# Patient Record
Sex: MALE | Race: WHITE | Employment: OTHER | URBAN - METROPOLITAN AREA
[De-identification: names, ages, dates, MRNs, and addresses within clinical notes are randomized per-mention and may not be internally consistent; named-entity substitution may affect disease eponyms.]

---

## 2017-03-07 ENCOUNTER — OFFICE VISIT (OUTPATIENT)
Dept: SURGERY | Facility: CLINIC | Age: 62
End: 2017-03-07

## 2017-03-07 VITALS
DIASTOLIC BLOOD PRESSURE: 77 MMHG | TEMPERATURE: 99 F | SYSTOLIC BLOOD PRESSURE: 134 MMHG | BODY MASS INDEX: 23 KG/M2 | WEIGHT: 168 LBS | HEART RATE: 79 BPM

## 2017-03-07 DIAGNOSIS — K64.4 EXTERNAL HEMORRHOIDS: ICD-10-CM

## 2017-03-07 DIAGNOSIS — Z21 HIV POSITIVE (HCC): ICD-10-CM

## 2017-03-07 DIAGNOSIS — K64.8 INTERNAL HEMORRHOIDS: ICD-10-CM

## 2017-03-07 DIAGNOSIS — C09.9 TONSIL CANCER (HCC): ICD-10-CM

## 2017-03-07 DIAGNOSIS — L81.9 PIGMENTED SKIN LESION OF UNCERTAIN NATURE: ICD-10-CM

## 2017-03-07 DIAGNOSIS — K62.82 DYSPLASIA OF ANUS: ICD-10-CM

## 2017-03-07 DIAGNOSIS — B97.7 HPV IN MALE: ICD-10-CM

## 2017-03-07 DIAGNOSIS — D01.3 CARCINOMA IN SITU OF ANAL CANAL: Primary | ICD-10-CM

## 2017-03-07 PROCEDURE — 46600 DIAGNOSTIC ANOSCOPY SPX: CPT | Performed by: COLON & RECTAL SURGERY

## 2017-03-07 PROCEDURE — 99214 OFFICE O/P EST MOD 30 MIN: CPT | Performed by: COLON & RECTAL SURGERY

## 2017-03-07 NOTE — PROGRESS NOTES
Follow Up Visit Note       Active Problems      1. Carcinoma in situ of anal canal    2. Dysplasia of anus    3. HPV in male    4. HIV positive ([facility] [facility]    5. Internal hemorrhoids    6. External hemorrhoids    7. [facility]    8.  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 Disp:  Rfl:    Abacavir Sulfate-Lamivudine (EPZICOM) 600-300 MG Oral Tab Take 1 tablet by mouth daily. Disp:  Rfl:    famotidine (PEPCID) 40 MG Oral Tab Take 40 mg by mouth 2 (two) times daily.  Disp:  Rfl: appears well-developed and well-nourished. No distress. Eyes: Right conjunctiva is not injected. Left conjunctiva is not injected. No scleral icterus. Neck: Trachea normal. Carotid bruit is not present. No tracheal deviation present.  No thyroid mass an superficial cervical, no deep cervical and no posterior cervical adenopathy present. Right: No supraclavicular adenopathy present. Left: No supraclavicular adenopathy present.    Neurological: He is alert and oriented to person, place, and ramon awake, alert, in no acute distress. He has no cervical adenopathy or thyromegaly. His lungs are clear to auscultation bilaterally. His heart rate and rhythm are regular. His abdomen is soft, nontender, nondistended, with normoactive bowel sounds.   He d assessment and plan. The physician agrees with the mentioned assessment and plan and has provided further documentation of their own if necessary.

## 2017-03-09 PROBLEM — L81.9 PIGMENTED SKIN LESION OF UNCERTAIN NATURE: Status: ACTIVE | Noted: 2017-03-09

## 2017-03-09 NOTE — PROCEDURES
Procedure:  Anoscopy    Surgeon: Paz Tai    Anesthesia: None    Findings: See the progress note attached for all findings    Operative Summary: The patient was placed in a prone position on the proctoscopy table, the hips were flexed in the jackknife p

## 2017-03-09 NOTE — PATIENT INSTRUCTIONS
This patient presents for continued care and treatment of his AIN 3 lesion of the anal canal.  The patient's initial operation was performed at an outside institution.   He presented to [facility] for wide local excision where the residual tumor was re They are all anywhere from 1-7 cm from the anal verge. There are moderate internal grade 3 hemorrhoids. There is no proctitis or ulcerations. There are no fissures or fistula.   The prostate is normal.    Due to the patient's increasing number in skin le

## 2017-04-03 ENCOUNTER — TELEPHONE (OUTPATIENT)
Dept: SURGERY | Facility: CLINIC | Age: 62
End: 2017-04-03

## 2017-04-03 DIAGNOSIS — K62.82 AIN (ANAL INTRAEPITHELIAL NEOPLASIA) ANAL CANAL: Primary | ICD-10-CM

## 2017-04-04 RX ORDER — ATORVASTATIN CALCIUM 10 MG/1
10 TABLET, FILM COATED ORAL NIGHTLY
COMMUNITY
End: 2022-01-18 | Stop reason: ALTCHOICE

## 2017-04-11 ENCOUNTER — ANESTHESIA EVENT (OUTPATIENT)
Dept: SURGERY | Facility: HOSPITAL | Age: 62
End: 2017-04-11
Payer: MEDICARE

## 2017-04-14 ENCOUNTER — SURGERY (OUTPATIENT)
Age: 62
End: 2017-04-14

## 2017-04-14 ENCOUNTER — ANESTHESIA (OUTPATIENT)
Dept: SURGERY | Facility: HOSPITAL | Age: 62
End: 2017-04-14
Payer: MEDICARE

## 2017-04-14 ENCOUNTER — HOSPITAL ENCOUNTER (OUTPATIENT)
Facility: HOSPITAL | Age: 62
Setting detail: HOSPITAL OUTPATIENT SURGERY
Discharge: HOME | End: 2017-04-14
Attending: COLON & RECTAL SURGERY | Admitting: COLON & RECTAL SURGERY
Payer: MEDICARE

## 2017-04-14 VITALS
DIASTOLIC BLOOD PRESSURE: 85 MMHG | RESPIRATION RATE: 16 BRPM | TEMPERATURE: 98 F | BODY MASS INDEX: 22.62 KG/M2 | WEIGHT: 167 LBS | SYSTOLIC BLOOD PRESSURE: 134 MMHG | HEIGHT: 72 IN | OXYGEN SATURATION: 100 % | HEART RATE: 87 BPM

## 2017-04-14 DIAGNOSIS — K62.82 AIN (ANAL INTRAEPITHELIAL NEOPLASIA) ANAL CANAL: ICD-10-CM

## 2017-04-14 PROCEDURE — 88305 TISSUE EXAM BY PATHOLOGIST: CPT | Performed by: COLON & RECTAL SURGERY

## 2017-04-14 PROCEDURE — 0DBQ7ZZ EXCISION OF ANUS, VIA NATURAL OR ARTIFICIAL OPENING: ICD-10-PCS | Performed by: COLON & RECTAL SURGERY

## 2017-04-14 RX ORDER — METOCLOPRAMIDE HYDROCHLORIDE 5 MG/ML
10 INJECTION INTRAMUSCULAR; INTRAVENOUS AS NEEDED
Status: DISCONTINUED | OUTPATIENT
Start: 2017-04-14 | End: 2017-04-14

## 2017-04-14 RX ORDER — NALOXONE HYDROCHLORIDE 0.4 MG/ML
80 INJECTION, SOLUTION INTRAMUSCULAR; INTRAVENOUS; SUBCUTANEOUS AS NEEDED
Status: DISCONTINUED | OUTPATIENT
Start: 2017-04-14 | End: 2017-04-14

## 2017-04-14 RX ORDER — ONDANSETRON 2 MG/ML
4 INJECTION INTRAMUSCULAR; INTRAVENOUS AS NEEDED
Status: DISCONTINUED | OUTPATIENT
Start: 2017-04-14 | End: 2017-04-14

## 2017-04-14 RX ORDER — HYDROCODONE BITARTRATE AND ACETAMINOPHEN 10; 325 MG/1; MG/1
2 TABLET ORAL AS NEEDED
Status: DISCONTINUED | OUTPATIENT
Start: 2017-04-14 | End: 2017-04-14

## 2017-04-14 RX ORDER — ACETAMINOPHEN AND CODEINE PHOSPHATE 120; 12 MG/5ML; MG/5ML
10 SOLUTION ORAL EVERY 6 HOURS PRN
Qty: 118 ML | Refills: 0 | Status: SHIPPED | OUTPATIENT
Start: 2017-04-14 | End: 2022-01-18 | Stop reason: ALTCHOICE

## 2017-04-14 RX ORDER — EPHEDRINE SULFATE 50 MG/ML
INJECTION, SOLUTION INTRAVENOUS
Status: COMPLETED
Start: 2017-04-14 | End: 2017-04-14

## 2017-04-14 RX ORDER — HYDROCODONE BITARTRATE AND ACETAMINOPHEN 10; 325 MG/1; MG/1
1 TABLET ORAL AS NEEDED
Status: DISCONTINUED | OUTPATIENT
Start: 2017-04-14 | End: 2017-04-14

## 2017-04-14 RX ORDER — HEPARIN SODIUM 5000 [USP'U]/ML
5000 INJECTION, SOLUTION INTRAVENOUS; SUBCUTANEOUS ONCE
Status: COMPLETED | OUTPATIENT
Start: 2017-04-14 | End: 2017-04-14

## 2017-04-14 RX ORDER — SODIUM CHLORIDE, SODIUM LACTATE, POTASSIUM CHLORIDE, CALCIUM CHLORIDE 600; 310; 30; 20 MG/100ML; MG/100ML; MG/100ML; MG/100ML
INJECTION, SOLUTION INTRAVENOUS CONTINUOUS
Status: DISCONTINUED | OUTPATIENT
Start: 2017-04-14 | End: 2017-04-14

## 2017-04-14 RX ORDER — HEPARIN SODIUM 5000 [USP'U]/ML
INJECTION, SOLUTION INTRAVENOUS; SUBCUTANEOUS
Status: DISCONTINUED
Start: 2017-04-14 | End: 2017-04-14

## 2017-04-14 RX ORDER — HYDROMORPHONE HYDROCHLORIDE 1 MG/ML
0.4 INJECTION, SOLUTION INTRAMUSCULAR; INTRAVENOUS; SUBCUTANEOUS EVERY 5 MIN PRN
Status: DISCONTINUED | OUTPATIENT
Start: 2017-04-14 | End: 2017-04-14

## 2017-04-14 RX ORDER — MEPERIDINE HYDROCHLORIDE 25 MG/ML
12.5 INJECTION INTRAMUSCULAR; INTRAVENOUS; SUBCUTANEOUS AS NEEDED
Status: DISCONTINUED | OUTPATIENT
Start: 2017-04-14 | End: 2017-04-14

## 2017-04-14 RX ORDER — MIDAZOLAM HYDROCHLORIDE 1 MG/ML
1 INJECTION INTRAMUSCULAR; INTRAVENOUS EVERY 5 MIN PRN
Status: DISCONTINUED | OUTPATIENT
Start: 2017-04-14 | End: 2017-04-14

## 2017-04-14 NOTE — INTERVAL H&P NOTE
Pre-op Diagnosis: AIN (anal intraepithelial neoplasia) anal canal [D01.3]    The above referenced H&P was reviewed by Dia Virk MD on 4/14/2017, the patient was examined and no significant changes have occurred in the patient's condition since the H&P

## 2017-04-14 NOTE — ANESTHESIA POSTPROCEDURE EVALUATION
[address] [place] [address] Patient Status:  Hospital Outpatient Surgery   Age/Gender 59year old male MRN [id number]   Location [address] Attending Wilbert Harris MD   Hosp Day # 0 PCP Sharif Cvaazos MD       Anesthesia

## 2017-04-14 NOTE — H&P (VIEW-ONLY)
Active Problems      1. Carcinoma in situ of anal canal     2. Dysplasia of anus     3. HPV in male     4. HIV positive ([facility]     5. Internal hemorrhoids     6. External hemorrhoids     7. [facility]     8.   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 Disp:   Rfl:     acyclovir (ZOVIRAX) 800 MG Oral Tab  Take 800 mg by mouth 2 (two) times daily.   Disp:   Rfl:     Abacavir Sulfate-Lamivudine (EPZ disturbance. Physical Findings    Wt 168 lb  Physical Exam   Constitutional: He is oriented to person, place, and time. He appears well-developed and well-nourished. No distress. Eyes: Right conjunctiva is not injected.  Left conjunctiva is not inj Right cervical: No superficial cervical, no deep cervical and no posterior cervical adenopathy present. Left cervical: No superficial cervical, no deep cervical and no posterior cervical adenopathy present.         Right: No supraclavicular adenopathy feeds with very mild chronic reflux. His weight and energy level remains stable. Physical exam:  The patient is awake, alert, in no acute distress. He has no cervical adenopathy or thyromegaly. His lungs are clear to auscultation bilaterally.   His he

## 2017-04-14 NOTE — OPERATIVE REPORT
[facility]  Op Note    Oscar Denver Location: OR   CSN [id number] MRN [id number]   Admission Date 4/14/2017 Operation Date 4/14/2017   Attending Physician Eddie Amador MD Operating Physician Bob Pearce MD     Pre-Operative Diagnosis: AIN (anal the anterior midline. Lesion was then sent to pathology for further histologic diagnosis. We then closed the resultant defect with 2-0 Vicryl. Several pigmented surrounding skin lesions were identified in the anal margin.   Some were resected full-thic

## 2017-04-14 NOTE — ANESTHESIA PREPROCEDURE EVALUATION
PRE-OP EVALUATION    Patient Name: Romy Dodson    Pre-op Diagnosis: AIN (anal intraepithelial neoplasia) anal canal [D01.3]    Procedure(s):  EXCISION OF SKIN LESION TIMES TWO AT ANAL MARGIN    Surgeon(s) and Role:     Yara Morris MD - Primary allergies. Anesthesia Evaluation    Patient summary reviewed.     Anesthetic Complications           GI/Hepatic/Renal      (+) GERD                           Cardiovascular                                                       Endo/Other

## 2017-04-17 ENCOUNTER — TELEPHONE (OUTPATIENT)
Dept: SURGERY | Facility: CLINIC | Age: 62
End: 2017-04-17

## 2017-04-17 NOTE — TELEPHONE ENCOUNTER
Pt called in am to state he has fever of 101, had surgery on anal skin tags 4/14. Doesn't know if fever is related to surgery. His other complaint is that he is constipated, states he did have a bowel movement yesterday but feels the need to go.  Told pt to

## 2017-04-18 ENCOUNTER — TELEPHONE (OUTPATIENT)
Dept: SURGERY | Facility: CLINIC | Age: 62
End: 2017-04-18

## 2017-04-18 ENCOUNTER — HOSPITAL ENCOUNTER (EMERGENCY)
Facility: HOSPITAL | Age: 62
Discharge: HOME | End: 2017-04-18
Attending: EMERGENCY MEDICINE
Payer: MEDICARE

## 2017-04-18 VITALS
SYSTOLIC BLOOD PRESSURE: 117 MMHG | RESPIRATION RATE: 20 BRPM | HEART RATE: 94 BPM | TEMPERATURE: 99 F | OXYGEN SATURATION: 98 % | DIASTOLIC BLOOD PRESSURE: 90 MMHG

## 2017-04-18 DIAGNOSIS — K56.41 FECAL IMPACTION(560.32): Primary | ICD-10-CM

## 2017-04-18 PROCEDURE — 99283 EMERGENCY DEPT VISIT LOW MDM: CPT

## 2017-04-18 NOTE — ED PROVIDER NOTES
Patient Seen in: [facility] Emergency Department    History   Patient presents with:  Constipation (gastrointestinal)    Stated Complaint: constipation - surgery on friday    HPI    Patient presents with constipation.   The patient had surgery on Fri External hemorrhoids 6/12/2012   • High cholesterol    • Exposure to radiation      [id number]   • Visual impairment      glasses reading and driving   • Esophageal reflux    • Carcinoma in situ of anal canal 10/9/2012     anal canal neoplasm carcinoma in s (AMBIEN) 10 MG Oral Tab,  Take 10 mg by mouth nightly as needed. sulfamethoxazole-trimethoprim (SMZ-TMP DS) 800-160 MG Oral Tab,  Take 1 tablet by mouth.  3x/week: MWF        Family History   Problem Relation Age of Onset   • CAD [Other] [OTHER] Father pressure on that area, it would be okay to perform a disimpaction. The patient was amenable to the attempt and I tried a few times to manually disimpact him and got only a small amount of stool.   The patient was having significant discomfort with the proc

## 2017-04-18 NOTE — TELEPHONE ENCOUNTER
Pt went to [facility] last night and they didn't do anything for him per the pt. He is in constant pain and has terrible pressure at his anus. He states he can feel a hard piece of poop stuck inside his rectum.  He called to state he is going to [facility]

## 2017-05-09 ENCOUNTER — OFFICE VISIT (OUTPATIENT)
Dept: SURGERY | Facility: CLINIC | Age: 62
End: 2017-05-09

## 2017-05-09 VITALS
WEIGHT: 165 LBS | HEIGHT: 72 IN | RESPIRATION RATE: 16 BRPM | DIASTOLIC BLOOD PRESSURE: 54 MMHG | BODY MASS INDEX: 22.35 KG/M2 | TEMPERATURE: 99 F | SYSTOLIC BLOOD PRESSURE: 70 MMHG

## 2017-05-09 DIAGNOSIS — D01.3 CARCINOMA IN SITU OF ANAL CANAL: Primary | ICD-10-CM

## 2017-05-09 PROCEDURE — 99024 POSTOP FOLLOW-UP VISIT: CPT | Performed by: COLON & RECTAL SURGERY

## 2017-05-09 NOTE — PATIENT INSTRUCTIONS
This patient presents for postoperative care regarding multiple anal margin biopsies. Final diagnosis does reveal at least one of the lesions to be a squamous cell carcinoma in situ, AIN 3. The patient has had some postoperative pain and symptoms.   H

## 2017-05-09 NOTE — H&P
New Patient Visit Note       Active Problems      1. Carcinoma in situ of anal canal        Chief Complaint   Patient presents with:  Anal Problem (GI): 1st PO EXCISION OF SKIN LESION TIMES TWO AT ANAL MARGIN on 4/14.  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 Disp:  Rfl:    Abacavir Sulfate-Lamivudine (EPZICOM) 600-300 MG Oral Tab Take 1 tablet by mouth daily. Disp:  Rfl:    famotidine (PEPCID) 40 MG Oral Tab Take 40 mg by mouth 2 (two) times daily.  Disp:  Rfl:    Lopinavir-Ritonavir (KALETRA) 400- several areas that were either excised or fulgurated. 2 areas still had suture material in their center. I did remove to sutures from 2 different sites. This should diminish drainage, and improve symptoms.            Assessment   Carcinoma in situ of misael

## 2017-09-26 ENCOUNTER — OFFICE VISIT (OUTPATIENT)
Dept: SURGERY | Facility: CLINIC | Age: 62
End: 2017-09-26

## 2017-09-26 VITALS
DIASTOLIC BLOOD PRESSURE: 62 MMHG | SYSTOLIC BLOOD PRESSURE: 100 MMHG | WEIGHT: 165 LBS | TEMPERATURE: 99 F | BODY MASS INDEX: 22 KG/M2

## 2017-09-26 DIAGNOSIS — K64.8 INTERNAL HEMORRHOIDS: ICD-10-CM

## 2017-09-26 DIAGNOSIS — Z21 HIV POSITIVE (HCC): ICD-10-CM

## 2017-09-26 DIAGNOSIS — D01.3 CARCINOMA IN SITU OF ANAL CANAL: Primary | ICD-10-CM

## 2017-09-26 PROCEDURE — 99213 OFFICE O/P EST LOW 20 MIN: CPT | Performed by: COLON & RECTAL SURGERY

## 2017-09-26 PROCEDURE — 46600 DIAGNOSTIC ANOSCOPY SPX: CPT | Performed by: COLON & RECTAL SURGERY

## 2017-09-26 NOTE — PATIENT INSTRUCTIONS
This patient presents for continued care and treatment of his AIN 3 lesion of the anal canal.   Patient's most recent operation was performed on April 14, 2017. It did demonstrate a recurrence of the AIN 3.  The patient's initial operation was performed at
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
## 2017-09-27 NOTE — PROCEDURES
Procedure:  Anoscopy    Surgeon: Darrin Ye    Anesthesia: None    Findings: See the progress note attached for all findings    Operative Summary: The patient was placed in a prone position on the proctoscopy table, the hips were flexed in the jackknife p

## 2018-03-20 ENCOUNTER — OFFICE VISIT (OUTPATIENT)
Dept: SURGERY | Facility: CLINIC | Age: 63
End: 2018-03-20

## 2018-03-20 VITALS
BODY MASS INDEX: 22 KG/M2 | DIASTOLIC BLOOD PRESSURE: 83 MMHG | TEMPERATURE: 99 F | SYSTOLIC BLOOD PRESSURE: 110 MMHG | HEART RATE: 78 BPM | WEIGHT: 165 LBS

## 2018-03-20 DIAGNOSIS — D01.3 CARCINOMA IN SITU OF ANAL CANAL: Primary | ICD-10-CM

## 2018-03-20 PROCEDURE — 99213 OFFICE O/P EST LOW 20 MIN: CPT | Performed by: COLON & RECTAL SURGERY

## 2018-03-20 PROCEDURE — 46600 DIAGNOSTIC ANOSCOPY SPX: CPT | Performed by: COLON & RECTAL SURGERY

## 2018-03-20 NOTE — PROCEDURES
Procedure:  Anoscopy    Surgeon: Justin Sal    Anesthesia: None    Findings: See the progress note attached for all findings    Operative Summary: The patient was placed in a prone position on the proctoscopy table, the hips were flexed in the jackknife p
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
## 2018-03-20 NOTE — PATIENT INSTRUCTIONS
This patient presents for continued care and treatment of his AIN 3 lesion of the anal canal.  The patient's initial operation was performed at an outside institution.   He presented to [facility] for wide local excision where the residual tumor was re

## 2018-09-18 ENCOUNTER — OFFICE VISIT (OUTPATIENT)
Dept: SURGERY | Facility: CLINIC | Age: 63
End: 2018-09-18
Payer: MEDICARE

## 2018-09-18 VITALS
BODY MASS INDEX: 22 KG/M2 | WEIGHT: 165 LBS | TEMPERATURE: 99 F | DIASTOLIC BLOOD PRESSURE: 74 MMHG | HEART RATE: 74 BPM | SYSTOLIC BLOOD PRESSURE: 109 MMHG

## 2018-09-18 DIAGNOSIS — C06.9 MALIGNANT NEOPLASM OF ORAL CAVITY (HCC): ICD-10-CM

## 2018-09-18 DIAGNOSIS — K62.82 DYSPLASIA OF ANUS: ICD-10-CM

## 2018-09-18 DIAGNOSIS — D01.3 CARCINOMA IN SITU OF ANAL CANAL: Primary | ICD-10-CM

## 2018-09-18 DIAGNOSIS — Z21 HIV POSITIVE (HCC): ICD-10-CM

## 2018-09-18 PROCEDURE — 46600 DIAGNOSTIC ANOSCOPY SPX: CPT | Performed by: COLON & RECTAL SURGERY

## 2018-09-18 PROCEDURE — 99213 OFFICE O/P EST LOW 20 MIN: CPT | Performed by: COLON & RECTAL SURGERY

## 2018-09-18 NOTE — PATIENT INSTRUCTIONS
This patient presents for continued care and treatment of his AIN 3 lesion of the anal canal.  The patient's most recent operation was performed on April 14, 2017. Final pathology did demonstrate a recurrence of the AIN 3.   The patient's initial operation patient for continued follow-up in 6 months time. If there are any questions or concerns prior to his scheduled appointment he should contact our office immediately.

## 2018-09-18 NOTE — PROGRESS NOTES
Follow Up Visit Note       Active Problems      1. Carcinoma in situ of anal canal    2. Dysplasia of anus    3. HIV positive ([facility]    4.  Malignant neoplasm of oral cavity [facility])          Chief Complaint   Patient presents with:  Anal Problem (GI): anal can performed my own physical exam and obtained the history as detailed above.   I have made all appropriate changes in documentation as necessary  I attest to the accuracy of this note as detailed above    Jesus Pugh MD [address] tube [phone]\"  2006: OTHER SURGICAL HISTORY      Comment:  open surgical reduction of a fracture (plates and                screws) - right toe  6/12/2012: UPPER GI ENDOSCOPY,EXAM      Comment:  EGD    The family history and social history have been rev Lopinavir-Ritonavir (KALETRA) 400-100 MG/5ML Oral Solution Take  by mouth 2 (two) times daily. Disp:  Rfl:    Metoclopramide HCl (REGLAN) 10 MG Oral Tab Take 10 mg by mouth 2 (two) times daily.  Disp:  Rfl:    Esomeprazole Magnesium (NEXIUM) 40 MG Oral Po internal hemorrhoid. Rectal exam shows no external hemorrhoid, no fissure, no mass, no tenderness and anal tone normal. Prostate is not enlarged and not tender.    Genitourinary Comments: Clinical examination of the perineum reveals her to be no evidence of to bolus feeds over the last 1 month. He states that he has been tolerating this very well. It has greatly helped with his heartburn and reflux symptoms. The patient states his bowel movements are unchanged.   He has a regular timing of his bowel movem

## 2018-09-18 NOTE — PROCEDURES
Procedure:  Anoscopy    Surgeon: Jennifer Shultz    Anesthesia: None    Findings: See the progress note attached for all findings    Operative Summary: The patient was placed in a prone position on the proctoscopy table, the hips were flexed in the jackknife p

## 2019-04-30 ENCOUNTER — OFFICE VISIT (OUTPATIENT)
Dept: SURGERY | Facility: CLINIC | Age: 64
End: 2019-04-30
Payer: MEDICARE

## 2019-04-30 VITALS
HEART RATE: 69 BPM | DIASTOLIC BLOOD PRESSURE: 70 MMHG | BODY MASS INDEX: 22.35 KG/M2 | RESPIRATION RATE: 18 BRPM | HEIGHT: 72 IN | WEIGHT: 165 LBS | SYSTOLIC BLOOD PRESSURE: 112 MMHG

## 2019-04-30 DIAGNOSIS — K64.2 PROLAPSED INTERNAL HEMORRHOIDS, GRADE 3: ICD-10-CM

## 2019-04-30 DIAGNOSIS — D01.3 CARCINOMA IN SITU OF ANAL CANAL: Primary | ICD-10-CM

## 2019-04-30 DIAGNOSIS — Z21 HIV POSITIVE (HCC): ICD-10-CM

## 2019-04-30 DIAGNOSIS — C06.9 MALIGNANT NEOPLASM OF ORAL CAVITY (HCC): ICD-10-CM

## 2019-04-30 PROCEDURE — 46600 DIAGNOSTIC ANOSCOPY SPX: CPT | Performed by: COLON & RECTAL SURGERY

## 2019-04-30 PROCEDURE — 99213 OFFICE O/P EST LOW 20 MIN: CPT | Performed by: COLON & RECTAL SURGERY

## 2019-04-30 NOTE — PROGRESS NOTES
Follow Up Visit Note       Active Problems      1. Carcinoma in situ of anal canal    2. HIV positive ([address]    3. Malignant neoplasm of oral cavity ([address]    4.  Prolapsed internal hemorrhoids, grade 3          Chief Complaint   Patient presents with:  Anal greatly improved his energy level and stamina. The patient underwent a colonoscopy approximately 1 year ago. He states that there was no abnormal findings. I acted as a scribe in this encounter.      The physician obtained a history, independently pe 8/15/2012   • Neuropathy     feet   • Personal history of antineoplastic chemotherapy     [id number]   • Rectal polyp 6/12/2012   • Sleep disturbances    • Throat pain    • Tonsil cancer ([facility] 2010    squamous cell cancer of tonsil, treated with radiation a Rfl:   •  famotidine (PEPCID) 40 MG Oral Tab, Take 40 mg by mouth 2 (two) times daily. , Disp: , Rfl:   •  Lopinavir-Ritonavir (KALETRA) 400-100 MG/5ML Oral Solution, Take  by mouth 2 (two) times daily. , Disp: , Rfl:   •  Metoclopramide HCl (REGLAN) 10 MG O place, and time. He appears well-developed and well-nourished. No distress. HENT:   Head: Normocephalic and atraumatic. Eyes: Conjunctivae and EOM are normal. No scleral icterus. Neck: Normal range of motion. Neck supple.    Pulmonary/Chest: Effort no patient transitioned his tube feeds to bolus feeding approximately 6 months ago. He states that this has greatly improved his gastro esophageal reflux. He is having very minimal reflux symptoms. He does not currently tolerate any oral intake.   His weigh months (around 10/30/2019).     Nelda Rea MD

## 2019-04-30 NOTE — PROCEDURES
Procedure:  Anoscopy    Surgeon: Bob Pearce    Anesthesia: None    Findings: See the progress note attached for all findings    Operative Summary: The patient was placed in a prone position on the proctoscopy table, the hips were flexed in the jackknife p

## 2019-04-30 NOTE — PATIENT INSTRUCTIONS
Loreto Sumner presents for continued evaluation and treatment regarding his AIN 3 lesion of the anal canal.  The patient's most recent operation was performed on April 14, 2017. Final pathology did demonstrate a recurrence of his AIN 3.   The patient's initial o Anoscopy was performed. This does demonstrate grade 3 internal hemorrhoids. There is no changes to the mucosal surface. There is no evidence of proctitis. There is no evidence of fissures or fistula.     This patient remains free of any evidence of recu

## 2019-09-03 ENCOUNTER — OFFICE VISIT (OUTPATIENT)
Dept: SURGERY | Facility: CLINIC | Age: 64
End: 2019-09-03
Payer: MEDICARE

## 2019-09-03 VITALS
SYSTOLIC BLOOD PRESSURE: 106 MMHG | WEIGHT: 165 LBS | BODY MASS INDEX: 22 KG/M2 | DIASTOLIC BLOOD PRESSURE: 64 MMHG | HEART RATE: 74 BPM | TEMPERATURE: 99 F

## 2019-09-03 DIAGNOSIS — K62.82 DYSPLASIA OF ANUS: ICD-10-CM

## 2019-09-03 DIAGNOSIS — Z21 HIV POSITIVE (HCC): ICD-10-CM

## 2019-09-03 DIAGNOSIS — C09.9 TONSIL CANCER (HCC): ICD-10-CM

## 2019-09-03 DIAGNOSIS — D01.3 CARCINOMA IN SITU OF ANAL CANAL: Primary | ICD-10-CM

## 2019-09-03 PROCEDURE — 99213 OFFICE O/P EST LOW 20 MIN: CPT | Performed by: COLON & RECTAL SURGERY

## 2019-09-03 PROCEDURE — 46600 DIAGNOSTIC ANOSCOPY SPX: CPT | Performed by: COLON & RECTAL SURGERY

## 2019-09-03 NOTE — PROCEDURES
Procedure:  Anoscopy    Surgeon: Deny Farr    Anesthesia: None    Findings: See the progress note attached for all findings    Operative Summary: The patient was placed in a prone position on the proctoscopy table, the hips were flexed in the jackknife p

## 2019-09-03 NOTE — PATIENT INSTRUCTIONS
Courtney Oscar presents for continued evaluation and treatment regarding his AIN 3 lesion of the anal canal.  The patient's most recent operation was performed on April 14, 2017. Final pathology did demonstrate a recurrence of his AIN 3.   The patient's initial o and rhythm are regular. His abdomen is soft, nontender, nondistended, normoactive bowel sounds present. The patient's G-tube is in good position with no surrounding erythema or drainage.   External examination of the perineum reveals no evidence of any sk

## 2019-09-03 NOTE — PROGRESS NOTES
Follow Up Visit Note       Active Problems      1. Carcinoma in situ of anal canal    2. Dysplasia of anus    3. HIV positive ([facility]    4.  Tonsil cancer [facility])          Chief Complaint   Patient presents with:  Rectal Cancer: pt is here for rectal cancer- 6 determined. The patient was treated with 24 hours of IV antibiotics and discharged home on oral antibiotics. He has followed up with his primary care physician. He has had no return of his fevers. I acted as a scribe in this encounter.      The ph hemorrhoids 8/6/2013   • Malignant neoplasm of skin 8/15/2012   • Neuropathy     feet   • Personal history of antineoplastic chemotherapy     [id number]   • Rectal polyp 6/12/2012   • Sleep disturbances    • Throat pain    • [facility]    [facility] 800 MG Oral Tab, Take 800 mg by mouth 2 (two) times daily. , Disp: , Rfl:   •  Abacavir Sulfate-Lamivudine (EPZICOM) 600-300 MG Oral Tab, Take 1 tablet by mouth daily. , Disp: , Rfl:   •  famotidine (PEPCID) 40 MG Oral Tab, Take 40 mg by 22.38 kg/m²   Physical Exam   Constitutional: He is oriented to person, place, and time. He appears well-developed and well-nourished. No distress. HENT:   Head: Normocephalic and atraumatic. Eyes: Conjunctivae and EOM are normal. No scleral icterus. patient's initial operation was performed at an outside institution. He presented to myself for care in 2012. He underwent wide local excision on September 12, 2012.   The final pathology did demonstrate AIN 3 present but the deep margin was free of dyspl no evidence of any skin lesions at the anal skin or anal margin. There are no ulcerations or condylomatous lesions. Digital rectal exam was performed and the prostate is normal.  There are no palpable masses.   He has 2/4 basal tone and maximal squeeze to

## 2020-03-03 ENCOUNTER — OFFICE VISIT (OUTPATIENT)
Dept: SURGERY | Facility: CLINIC | Age: 65
End: 2020-03-03
Payer: MEDICARE

## 2020-03-03 VITALS — HEART RATE: 66 BPM | SYSTOLIC BLOOD PRESSURE: 90 MMHG | TEMPERATURE: 98 F | DIASTOLIC BLOOD PRESSURE: 53 MMHG

## 2020-03-03 DIAGNOSIS — C09.9 TONSIL CANCER (HCC): ICD-10-CM

## 2020-03-03 DIAGNOSIS — D01.3 CARCINOMA IN SITU OF ANAL CANAL: Primary | ICD-10-CM

## 2020-03-03 DIAGNOSIS — K64.2 PROLAPSED INTERNAL HEMORRHOIDS, GRADE 3: ICD-10-CM

## 2020-03-03 DIAGNOSIS — Z21 HIV POSITIVE (HCC): ICD-10-CM

## 2020-03-03 DIAGNOSIS — B97.7 HPV IN MALE: ICD-10-CM

## 2020-03-03 PROCEDURE — 46600 DIAGNOSTIC ANOSCOPY SPX: CPT | Performed by: COLON & RECTAL SURGERY

## 2020-03-03 PROCEDURE — 99213 OFFICE O/P EST LOW 20 MIN: CPT | Performed by: COLON & RECTAL SURGERY

## 2020-03-03 NOTE — PATIENT INSTRUCTIONS
Judith Guzman presents for continued evaluation and treatment regarding his AIN 3 lesion of the anal canal.  The patient's most recent operation was performed on April 14, 2017. Final pathology did demonstrate a recurrence of his AIN 3.   The patient's initial o evidence of any recurrence of his AIN 3 or any evidence of squamous cell carcinoma of the anus. I will see the patient again in 6 months for continued care and evaluation. The patient should return sooner for any new or worsening symptoms.

## 2020-03-03 NOTE — PROGRESS NOTES
Follow Up Visit Note       Active Problems      1. Carcinoma in situ of anal canal    2. Prolapsed internal hemorrhoids, grade 3    3. Tonsil cancer ([facility]    4. HIV positive ([facility]    5.  HPV in male          Chief Complaint   Patient presents with:  Anal / an assessment and plan. The physician performed all medical decision making. Elzbieta Alarcon PA-C    I agree with the note as scribed by the PA  I performed my own physical exam and obtained the history as detailed above.   I have made all appropriate glasses reading and driving     Past Surgical History:   Procedure Laterality Date   • COLONOSCOPY  6/12/2012   • EXC RECT DUSTIN TRANSANAL FULL  9/12/2012   • EXCISION ANAL MASS/POLYP N/A 4/14/2017    Performed by Jacquelyn Bentley MD at [address] [place] [address]   • OTHER Solution, Take  by mouth 2 (two) times daily. , Disp: , Rfl:   •  Metoclopramide HCl (REGLAN) 10 MG Oral Tab, Take 10 mg by mouth 2 (two) times daily. , Disp: , Rfl:   •  Esomeprazole Magnesium (NEXIUM) 40 MG Oral Powd Pack, Take 40 mg by mouth every morning normal and normal heart sounds. No murmur heard. Pulmonary/Chest: No accessory muscle usage. No tachypnea. No respiratory distress. He has no decreased breath sounds. He has no wheezes. He has no rhonchi. He has no rales. Abdominal: Soft.  Normal appea inguinal and no supraclavicular adenopathy present. Nursing note and vitals reviewed.        Assessment   Carcinoma in situ of anal canal  (primary encounter diagnosis)  Prolapsed internal hemorrhoids, grade 3  Tonsil cancer (HCC)  HIV positive (HCC)  HPV shape and size. There are no palpable masses. On anoscopy there is no evidence of any condyloma, ulceration, excoriation, evidence of any Crohn's disease, or proctitis. There are large grade 3 hemorrhoids circumferentially.     The patient continues to d

## 2020-09-15 ENCOUNTER — OFFICE VISIT (OUTPATIENT)
Dept: SURGERY | Facility: CLINIC | Age: 65
End: 2020-09-15
Payer: MEDICARE

## 2020-09-15 VITALS
DIASTOLIC BLOOD PRESSURE: 80 MMHG | BODY MASS INDEX: 21.67 KG/M2 | WEIGHT: 160 LBS | SYSTOLIC BLOOD PRESSURE: 129 MMHG | TEMPERATURE: 99 F | HEIGHT: 72 IN | HEART RATE: 70 BPM

## 2020-09-15 DIAGNOSIS — D01.3 CARCINOMA IN SITU OF ANAL CANAL: Primary | ICD-10-CM

## 2020-09-15 DIAGNOSIS — B97.7 HPV IN MALE: ICD-10-CM

## 2020-09-15 DIAGNOSIS — K22.719 BARRETT'S ESOPHAGUS WITH DYSPLASIA: ICD-10-CM

## 2020-09-15 DIAGNOSIS — L81.9 PIGMENTED SKIN LESION OF UNCERTAIN NATURE: ICD-10-CM

## 2020-09-15 DIAGNOSIS — Z21 HIV POSITIVE (HCC): ICD-10-CM

## 2020-09-15 PROCEDURE — 46600 DIAGNOSTIC ANOSCOPY SPX: CPT | Performed by: COLON & RECTAL SURGERY

## 2020-09-15 PROCEDURE — 99213 OFFICE O/P EST LOW 20 MIN: CPT | Performed by: COLON & RECTAL SURGERY

## 2020-09-15 NOTE — PROGRESS NOTES
Follow Up Visit Note       Active Problems      1. Carcinoma in situ of anal canal    2. HPV in male    3. HIV positive ([facility] [address]    4. Pigmented skin lesion of uncertain nature    5.  Quick's esophagus with dysplasia          Chief Complaint   Patient presen medication for HIV decreased to 1 time per day.   This did cause some hypotension, however this has resolved since seeing cardiology and nephrology.      The patient does have a PEG feeding tube in place.  The patient has had a recent G-tube exchange in Feb neoplasia   • Esophageal reflux    • Exposure to radiation     [id number]   • External hemorrhoids 6/12/2012   • Hepatitis     Hep A and B 20 years ago   • Hiatal hernia 6/12/2012    small   • High cholesterol    • History of hepatitis 1996   • HIV positive Oral Tab, Take 50 mg by mouth daily. , Disp: , Rfl:   •  Darunavir-Cobicistat 800-150 MG Oral Tab, Take 1 tablet by mouth daily. , Disp: , Rfl:   •  Abacavir Sulfate-Lamivudine (EPZICOM) 600-300 MG Oral Tab, Take 1 tablet by mouth daily. , Disp: , Rfl:   •  f nausea and vomiting. Genitourinary: Negative for difficulty urinating, dysuria, frequency and urgency. Musculoskeletal: Negative for arthralgias and myalgias. Skin: Negative for color change and rash.    Neurological: Negative for tremors, syncope and Lichenification  No Abscess  No Fistula in ano  No Anterior Fissure  No Posterior Fissure  No Pilonidal Cyst    See Procedures:  Anoscopy    Clinical examination of the rectum including anoscopy reveals there to be no external condyloma, hemorrhoids, fissu he has no new issues since his last visit. He denies any blood, mucus, or dark tarry stools. He denies having any itching, irritation, or pain. He does not palpate any new lesions, lumps or bumps.   He denies having any fevers, chills, nausea, vomiting, care and evaluation. The patient should return sooner for any new or worsening symptoms. No orders of the defined types were placed in this encounter. Imaging & Referrals   None    Follow Up  Return in 26 weeks (on 3/16/2021).     Siobhan Lim MD

## 2020-09-15 NOTE — PATIENT INSTRUCTIONS
Eduar Saunders presents for continued evaluation and treatment regarding his AIN 3 lesion of the anal canal.  The patient's most recent operation was performed on April 14, 2017.  Final pathology did demonstrate a recurrence of his AIN 3.  The patient's initial o fissures, fistula, or abscesses. Digital rectal exam reveals the prostate to be of normal shape and size. There are no palpable masses.   On anoscopy there is no evidence of any condyloma, ulceration, excoriation, evidence of any Crohn's disease, hemorrho

## 2020-09-15 NOTE — PROCEDURES
Procedure:  Anoscopy    Surgeon: Sera Mcclendon    Anesthesia: None    Findings: See the progress note attached for all findings    Operative Summary: The patient was placed in a prone position on the proctoscopy table, the hips were flexed in the jackknife p

## 2021-03-30 ENCOUNTER — OFFICE VISIT (OUTPATIENT)
Dept: SURGERY | Facility: CLINIC | Age: 66
End: 2021-03-30
Payer: MEDICARE

## 2021-03-30 VITALS
DIASTOLIC BLOOD PRESSURE: 49 MMHG | BODY MASS INDEX: 21.67 KG/M2 | SYSTOLIC BLOOD PRESSURE: 86 MMHG | HEART RATE: 68 BPM | HEIGHT: 72 IN | TEMPERATURE: 98 F | WEIGHT: 160 LBS

## 2021-03-30 DIAGNOSIS — C44.90 MALIGNANT NEOPLASM OF SKIN: ICD-10-CM

## 2021-03-30 DIAGNOSIS — D01.3 CARCINOMA IN SITU OF ANAL CANAL: Primary | ICD-10-CM

## 2021-03-30 DIAGNOSIS — Z01.818 PRE-OP TESTING: ICD-10-CM

## 2021-03-30 DIAGNOSIS — C09.9 TONSIL CANCER (HCC): ICD-10-CM

## 2021-03-30 DIAGNOSIS — Z21 HIV POSITIVE (HCC): ICD-10-CM

## 2021-03-30 DIAGNOSIS — L81.9 PIGMENTED SKIN LESION OF UNCERTAIN NATURE: ICD-10-CM

## 2021-03-30 DIAGNOSIS — K62.82 DYSPLASIA OF ANUS: ICD-10-CM

## 2021-03-30 PROCEDURE — 99215 OFFICE O/P EST HI 40 MIN: CPT | Performed by: COLON & RECTAL SURGERY

## 2021-03-30 PROCEDURE — 46600 DIAGNOSTIC ANOSCOPY SPX: CPT | Performed by: COLON & RECTAL SURGERY

## 2021-03-30 NOTE — PROCEDURES
Procedure:  Anoscopy    Surgeon: Anuja Hurtado    Anesthesia: None    Findings: See the progress note attached for all findings    Operative Summary: The patient was placed in a prone position on the proctoscopy table, the hips were flexed in the jackknife p

## 2021-03-30 NOTE — PROGRESS NOTES
Follow Up Visit Note       Active Problems      1. Carcinoma in situ of anal canal    2. Dysplasia of anus    3. HIV positive ([address]    4. Tonsil cancer ([address]    5. Malignant neoplasm of skin    6.  Pigmented skin lesion of uncertain nature          Chief Co history, performing the examination, counseling and education, ordering tests, referring and communicating with other healthcare professionals, documenting clinical information, independently interpreting results, coordinating care, and communication of an • Tonsil cancer ([facility] [address] 2010    squamous cell cancer of tonsil, treated with radiation and chemotherapy   • Visual impairment     glasses reading and driving     Past Surgical History:   Procedure Laterality Date   • COLONOSCOPY  6/12/2012   • EXC RECT TU     Attends Confucianism Services:       Active Member of Clubs or Organizations:       Attends Club or Organization Meetings:       Marital Status:   Intimate Partner Violence:       Fear of Current or Ex-Partner:       Emotionally Abused:       Physically nosebleeds, sore throat and trouble swallowing. Respiratory: Negative for apnea, cough, shortness of breath and wheezing. Cardiovascular: Negative for chest pain, palpitations and leg swelling.    Gastrointestinal: Negative for abdominal distention, a lesions. Prostate is diminished in size. There are no external hemorrhoids. There is no proctitis or Crohn's disease.                Assessment   Carcinoma in situ of anal canal  (primary encounter diagnosis)  Dysplasia of anus  HIV positive ([facility] [address]  Tonsi size.  They are irregularly shaped. They could possibly be Kaposi sarcoma. They have not changed much from prior exam, except potentially becoming more pigmented. There are no HPV lesions. There are no AIN type ulcerations or lesions.   Prostate is dimi

## 2021-03-30 NOTE — PATIENT INSTRUCTIONS
This patient presents for continued care and treatment of his AIN 3 lesion of the anal canal.  The patient's initial operation was performed at an outside institution.   He presented to [facility] for wide local excision where the residual tumor was re disease. At this point I recommend excisional biopsy of the 2 soft tissue pigmented lesions on the right anterolateral aspect of the anal margin 3 cm away from the anal verge.     This patient has some personal issues that we will put off the surgery unt

## 2021-06-18 ENCOUNTER — LAB REQUISITION (OUTPATIENT)
Dept: LAB | Facility: HOSPITAL | Age: 66
End: 2021-06-18
Payer: MEDICARE

## 2021-06-18 DIAGNOSIS — Z00.8 ENCOUNTER FOR OTHER GENERAL EXAMINATION: ICD-10-CM

## 2021-06-18 PROCEDURE — 88305 TISSUE EXAM BY PATHOLOGIST: CPT | Performed by: COLON & RECTAL SURGERY

## 2021-07-06 ENCOUNTER — OFFICE VISIT (OUTPATIENT)
Dept: SURGERY | Facility: CLINIC | Age: 66
End: 2021-07-06

## 2021-07-06 VITALS
BODY MASS INDEX: 21.67 KG/M2 | HEART RATE: 76 BPM | HEIGHT: 72 IN | SYSTOLIC BLOOD PRESSURE: 88 MMHG | WEIGHT: 160 LBS | DIASTOLIC BLOOD PRESSURE: 53 MMHG | TEMPERATURE: 98 F

## 2021-07-06 DIAGNOSIS — B00.89 HERPES SIMPLEX VIRUS (HSV) INFECTION OF BUTTOCK: ICD-10-CM

## 2021-07-06 DIAGNOSIS — K64.2 PROLAPSED INTERNAL HEMORRHOIDS, GRADE 3: ICD-10-CM

## 2021-07-06 DIAGNOSIS — L08.9 HERPES SIMPLEX VIRUS (HSV) INFECTION OF BUTTOCK: ICD-10-CM

## 2021-07-06 DIAGNOSIS — B97.7 HPV IN MALE: ICD-10-CM

## 2021-07-06 DIAGNOSIS — D01.3 CARCINOMA IN SITU OF ANAL CANAL: Primary | ICD-10-CM

## 2021-07-06 DIAGNOSIS — Z21 HIV POSITIVE (HCC): ICD-10-CM

## 2021-07-06 DIAGNOSIS — K62.82 DYSPLASIA OF ANUS: ICD-10-CM

## 2021-07-06 PROCEDURE — 99024 POSTOP FOLLOW-UP VISIT: CPT | Performed by: COLON & RECTAL SURGERY

## 2021-07-06 RX ORDER — LEVOTHYROXINE SODIUM 0.07 MG/1
TABLET ORAL
COMMUNITY
Start: 2021-04-28

## 2021-07-06 RX ORDER — TESTOSTERONE CYPIONATE 200 MG/ML
INJECTION INTRAMUSCULAR
COMMUNITY
Start: 2021-06-14

## 2021-07-06 NOTE — PROGRESS NOTES
Patient in office currently. Pathology reviewed to patient by Dr. Loreta Guerrero. Copy provided to patient. 3 month follow up appointment made.     Future Appointments  10/12/2021 2:00 PM    Vladimir Jeffrey MD          [id number]        [facility] [address]

## 2021-07-06 NOTE — PATIENT INSTRUCTIONS
This patient is known to have had prior in situ squamous cell cancer of the anus, dysplasia and other lesions, and has required significant surveillance of the perianal skin. He is HIV positive.     The patient underwent excisional biopsy of 2 suspicious l

## 2021-07-06 NOTE — PROGRESS NOTES
Post Operative Visit Note       Active Problems  1. Carcinoma in situ of anal canal    2. Dysplasia of anus    3. HPV in male    4. HIV positive ([facility] [address]    5. Prolapsed internal hemorrhoids, grade 3    6.  Herpes simplex virus (HSV) infection of buttock External hemorrhoids 6/12/2012   • Hepatitis     Hep A and B 20 years ago   • Hiatal hernia 6/12/2012    small   • High cholesterol    • History of hepatitis 1996   • HIV positive ([facility] [address]    • HPV in male 8/14/2012   • Internal hemorrhoids 8/6/2013   • Caleb Melendez by Tube route daily. , Disp: , Rfl:   •  Dolutegravir Sodium (TIVICAY) 50 MG Oral Tab, Take 50 mg by mouth daily. , Disp: , Rfl:   •  Darunavir-Cobicistat 800-150 MG Oral Tab, Take 1 tablet by mouth daily. , Disp: , Rfl:   •  Atorvastatin Calcium 10 MG Oral T distention, abdominal pain, anal bleeding, blood in stool, constipation, diarrhea, nausea and vomiting. Genitourinary: Negative for difficulty urinating, dysuria, frequency and urgency. Musculoskeletal: Negative for arthralgias and myalgias.    Skin: Ne September 2021. I will see the patient again in 3 months for continued surveillance on the perianal region secondary to his in situ squamous cell cancer of the anus, other dysplasia of the anal region, and now the HSV, and HPV infections.     The patient

## 2021-10-12 ENCOUNTER — OFFICE VISIT (OUTPATIENT)
Dept: SURGERY | Facility: CLINIC | Age: 66
End: 2021-10-12
Payer: MEDICARE

## 2021-10-12 VITALS — BODY MASS INDEX: 22.35 KG/M2 | HEIGHT: 72 IN | TEMPERATURE: 98 F | WEIGHT: 165 LBS

## 2021-10-12 DIAGNOSIS — L08.9 HERPES SIMPLEX VIRUS (HSV) INFECTION OF BUTTOCK: ICD-10-CM

## 2021-10-12 DIAGNOSIS — B97.7 HPV IN MALE: ICD-10-CM

## 2021-10-12 DIAGNOSIS — D01.3 CARCINOMA IN SITU OF ANAL CANAL: Primary | ICD-10-CM

## 2021-10-12 DIAGNOSIS — B00.89 HERPES SIMPLEX VIRUS (HSV) INFECTION OF BUTTOCK: ICD-10-CM

## 2021-10-12 PROCEDURE — 99213 OFFICE O/P EST LOW 20 MIN: CPT | Performed by: COLON & RECTAL SURGERY

## 2021-10-12 PROCEDURE — 46600 DIAGNOSTIC ANOSCOPY SPX: CPT | Performed by: COLON & RECTAL SURGERY

## 2021-10-12 NOTE — PROGRESS NOTES
Follow Up Visit Note       Active Problems      1. Carcinoma in situ of anal canal    2. Herpes simplex virus (HSV) infection of buttock    3.  HPV in male          Chief Complaint   Patient presents with:  Colon Problem: 3 month continued care on excision patient follows with Dr. Ning Ibarra as his infectious disease physician who manages his HIV. He states his CD4 count has been stable around 170 for the last 10 years. I acted as a scribe in this encounter.      The physician obtained a history, inde External hemorrhoids 6/12/2012   • Hepatitis     Hep A and B 20 years ago   • Hiatal hernia 6/12/2012    small   • High cholesterol    • History of hepatitis 1996   • HIV positive ([facility] [address]    • HPV in male 8/14/2012   • Internal hemorrhoids 8/6/2013   • Cathryn Minor 800-150 MG Oral Tab, Take 1 tablet by mouth daily. , Disp: , Rfl:   •  acetaminophen-codeine 120-12 MG/5ML Oral Solution, Take 10 mL by mouth every 6 (six) hours as needed for Pain., Disp: 118 mL, Rfl: 0  •  Atorvastatin Calcium 10 MG Oral Tab, Take 10 mg b myalgias. Skin: Negative for color change and rash. Neurological: Negative for tremors, syncope and weakness. Hematological: Negative for adenopathy. Does not bruise/bleed easily.    Psychiatric/Behavioral: Negative for behavioral problems and sleep d at this time. He states that he is doing well overall. He denies any new lumps, bumps, or masses. He denies any new noticeable skin changes around the anus.   He states that he is having his normal bowel movements. The patient's last colonoscopy was in 2

## 2021-10-12 NOTE — PROCEDURES
Procedure:  Anoscopy    Surgeon: Louis Tanner    Anesthesia: None    Findings: See the progress note attached for all findings    Operative Summary: The patient was placed in a prone position on the proctoscopy table, the hips were flexed in the jackknife p

## 2021-10-12 NOTE — PATIENT INSTRUCTIONS
This patient presents for continued care and treatment of his AIN 3 lesion of the anal canal.     The patient's initial operation was performed at an outside [address] [place] [address] presented to [facility] for wide local excision where the residual tumor was evidence of any condyloma, ulceration, excoriation, evidence of any Crohn's disease, hemorrhoids, or proctitis. The patient demonstrates no new evidence of any new AIN lesions or condyloma.   I will see the patient again in 3 months as he did have recent

## 2022-01-18 ENCOUNTER — OFFICE VISIT (OUTPATIENT)
Dept: SURGERY | Facility: CLINIC | Age: 67
End: 2022-01-18
Payer: MEDICARE

## 2022-01-18 VITALS
DIASTOLIC BLOOD PRESSURE: 70 MMHG | HEIGHT: 72 IN | BODY MASS INDEX: 22.35 KG/M2 | SYSTOLIC BLOOD PRESSURE: 111 MMHG | HEART RATE: 74 BPM | WEIGHT: 165 LBS | TEMPERATURE: 98 F

## 2022-01-18 DIAGNOSIS — K62.82 DYSPLASIA OF ANUS: ICD-10-CM

## 2022-01-18 DIAGNOSIS — C09.9 TONSIL CANCER (HCC): ICD-10-CM

## 2022-01-18 DIAGNOSIS — K64.2 PROLAPSED INTERNAL HEMORRHOIDS, GRADE 3: ICD-10-CM

## 2022-01-18 DIAGNOSIS — B97.7 HPV IN MALE: ICD-10-CM

## 2022-01-18 DIAGNOSIS — Z21 HIV POSITIVE (HCC): ICD-10-CM

## 2022-01-18 DIAGNOSIS — D01.3 CARCINOMA IN SITU OF ANAL CANAL: Primary | ICD-10-CM

## 2022-01-18 PROCEDURE — 46600 DIAGNOSTIC ANOSCOPY SPX: CPT | Performed by: COLON & RECTAL SURGERY

## 2022-01-18 PROCEDURE — 99213 OFFICE O/P EST LOW 20 MIN: CPT | Performed by: COLON & RECTAL SURGERY

## 2022-01-18 NOTE — PATIENT INSTRUCTIONS
This patient presents for continued care and treatment of his AIN 3 lesion of the anal canal.  The patient's initial operation was performed at an outside institution.   He presented to [facility] for wide local excision where the residual tumor was re If this hemorrhoid gets bigger or causes further symptoms I would recommend rubber band treatments. He remains free of any evidence of recurrent or residual AIN 3, HPV, or other pigmented lesions. I will see his patient again in 3 months.   He should

## 2022-01-18 NOTE — PROGRESS NOTES
Follow Up Visit Note       Active Problems      1. Carcinoma in situ of anal canal    2. Dysplasia of anus    3. HPV in male    4. HIV positive ([facility] [address]    5. Tonsil cancer ([facility] [address]    6.  Prolapsed internal hemorrhoids, grade 3          Chief Complaint   Patient weight and energy level remains stable. My total time spent with this patient on today's visit was 20 minutes.   This includes time in preparation, obtaining and reviewing history, performing the examination, counseling and education, ordering tests, r Laterality Date   • COLONOSCOPY  6/12/2012   • EXC RECT DUSTIN TRANSANAL FULL  9/12/2012   • OTHER      port insertion   • OTHER SURGICAL HISTORY  2011    \"j-tube 2011, peg tube [id number]\"   • OTHER SURGICAL HISTORY  2006    open surgical reduction of a fra Take 1 tablet by mouth. 3x/week: MWGILDA , Disp: , Rfl:      Review of Systems  The Review of Systems has been reviewed by me during today. Review of Systems   Constitutional: Negative for chills, diaphoresis, fatigue, fever and unexpected weight change.    HE internal hemorrhoids, grade 3    Plan   This patient presents for continued care and treatment of his AIN 3 lesion of the anal canal.  The patient's initial operation was performed at an outside institution.   He presented to [facility] for wide local canal.  I recommend no current therapy. If this hemorrhoid gets bigger or causes further symptoms I would recommend rubber band treatments. He remains free of any evidence of recurrent or residual AIN 3, HPV, or other pigmented lesions.     I will see h

## 2022-08-30 ENCOUNTER — OFFICE VISIT (OUTPATIENT)
Facility: LOCATION | Age: 67
End: 2022-08-30
Payer: MEDICARE

## 2022-08-30 VITALS — TEMPERATURE: 98 F | HEART RATE: 60 BPM

## 2022-08-30 DIAGNOSIS — B97.7 HPV IN MALE: ICD-10-CM

## 2022-08-30 DIAGNOSIS — B00.89 HERPES SIMPLEX VIRUS (HSV) INFECTION OF BUTTOCK: ICD-10-CM

## 2022-08-30 DIAGNOSIS — C09.9 TONSIL CANCER (HCC): ICD-10-CM

## 2022-08-30 DIAGNOSIS — D01.3 CARCINOMA IN SITU OF ANAL CANAL: Primary | ICD-10-CM

## 2022-08-30 DIAGNOSIS — K62.82 DYSPLASIA OF ANUS: ICD-10-CM

## 2022-08-30 DIAGNOSIS — Z21 HIV POSITIVE (HCC): ICD-10-CM

## 2022-08-30 PROCEDURE — 99213 OFFICE O/P EST LOW 20 MIN: CPT | Performed by: COLON & RECTAL SURGERY

## 2022-08-30 PROCEDURE — 46600 DIAGNOSTIC ANOSCOPY SPX: CPT | Performed by: COLON & RECTAL SURGERY

## 2022-08-30 NOTE — PATIENT INSTRUCTIONS
This patient presents for continued care and treatment of his AIN 3 lesion of the anal canal.  The patient's initial operation was performed at an outside institution. He presented to [facility] for wide local excision where the residual tumor was resected on September 12, 2012. Pathology did demonstrate AIN 3 present. There was dysplastic epithelium extending to both mucosal margins. The deep margin was free of dysplasia. There was no evidence of invasive cancer. 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 On his last biopsies performed on June 18, 2021, one of the anal lesions also was positive for herpes viral cytopathic changes. The patient denies any symptoms such as nausea, vomiting, diarrhea, constipation, fevers, or chills. He denies any noticeable skin changes near the anus. He denies any black or tarry stools, bright red blood per rectum, or blood within the stool. The patient's last colonoscopy was in 2012 prior to his surgery. 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 The patient has recently followed up with his infectious disease doctor. Both of these issues are stable. The patient does have a PEG feeding tube in place. He is tolerating tube feeds with very mild chronic reflux. His weight and energy level remains stable. Clinical exam including anoscopy reveals him to have an anal margin free of any evidence of new or suspicious lesions. There are no ulcerations. There is no pruritus. There are no signs or evidence of HPV or AIN disease. There are no fissures or fistulae. Prostate is normal.  Anal tone is 1/4 basal, 1/4 maximum squeeze pressure. There is no evidence of proctitis or Crohn's disease. There are no HPV or AIN lesions on the anal canal or anal mucosal surfaces. This patient remains free of any evidence of perianal disease.     I will see him again in January 2023. He should present to me urgently for any findings on self-examination even if there prior to the next scheduled visits.

## 2023-01-09 ENCOUNTER — OFFICE VISIT (OUTPATIENT)
Facility: LOCATION | Age: 68
End: 2023-01-09
Payer: MEDICARE

## 2023-01-09 VITALS — HEART RATE: 95 BPM | TEMPERATURE: 99 F

## 2023-01-09 DIAGNOSIS — B00.89 HERPES SIMPLEX VIRUS (HSV) INFECTION OF BUTTOCK: ICD-10-CM

## 2023-01-09 DIAGNOSIS — Z21 HIV POSITIVE (HCC): ICD-10-CM

## 2023-01-09 DIAGNOSIS — D01.3 CARCINOMA IN SITU OF ANAL CANAL: Primary | ICD-10-CM

## 2023-01-09 DIAGNOSIS — K64.2 PROLAPSED INTERNAL HEMORRHOIDS, GRADE 3: ICD-10-CM

## 2023-01-09 DIAGNOSIS — K62.82 DYSPLASIA OF ANUS: ICD-10-CM

## 2023-01-09 DIAGNOSIS — C09.9 TONSIL CANCER (HCC): ICD-10-CM

## 2023-01-09 DIAGNOSIS — B97.7 HPV IN MALE: ICD-10-CM

## 2023-01-09 PROCEDURE — 99213 OFFICE O/P EST LOW 20 MIN: CPT | Performed by: COLON & RECTAL SURGERY

## 2023-01-09 PROCEDURE — 46600 DIAGNOSTIC ANOSCOPY SPX: CPT | Performed by: COLON & RECTAL SURGERY

## 2023-01-14 NOTE — PATIENT INSTRUCTIONS
This patient presents for continued care and treatment of his AIN 3 lesion of the anal canal.  The patient's initial operation was performed at an outside institution. He presented to [facility] for wide local excision where the residual tumor was resected on September 12, 2012. Pathology did demonstrate AIN 3 present. There was dysplastic epithelium extending to both mucosal margins. The deep margin was free of dysplasia. There was no evidence of invasive cancer. 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 On his last biopsies performed on June 18, 2021, one of the anal lesions also was positive for herpes viral cytopathic changes. The patient denies any symptoms such as nausea, vomiting, diarrhea, constipation, fevers, or chills. He denies any noticeable skin changes near the anus. He denies any black or tarry stools, bright red blood per rectum, or blood within the stool. The patient's last colonoscopy was in 2012 prior to his surgery. 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 The patient has no current findings on self-exam.  He gets occasional bright red blood after defecation only on the toilet paper. He has no melena. No diarrhea. No significant constipation. He has no anorectal pain or symptoms. Clinical exam including anoscopy reveals him to have grade 2 and grade 3 internal hemorrhoids. There are no external hemorrhoids on today's exam.  Sphincter tone is 1/4 basal, 2/4 maximum squeeze pressure. He has scarring and deformity in the anal canal that is minimal.  He has some evidence of scars from biopsies around the anal margin. There are no new signs on physical exam of HPV, AIN, or squamous cell cancer. There is no evidence of Kaposi's sarcoma. There are no new or suspicious pigmented lesions. There are no active herpetic vesicles.     I will see his patient again in July 2023.

## 2023-05-30 ENCOUNTER — HOSPITAL ENCOUNTER (INPATIENT)
Facility: HOSPITAL | Age: 68
LOS: 1 days | Discharge: HOME | DRG: 343 | End: 2023-05-31
Attending: STUDENT IN AN ORGANIZED HEALTH CARE EDUCATION/TRAINING PROGRAM | Admitting: INTERNAL MEDICINE
Payer: MEDICARE

## 2023-05-30 ENCOUNTER — ANESTHESIA EVENT (OUTPATIENT)
Dept: SURGERY | Facility: HOSPITAL | Age: 68
DRG: 343 | End: 2023-05-30
Payer: MEDICARE

## 2023-05-30 ENCOUNTER — ANESTHESIA (OUTPATIENT)
Dept: SURGERY | Facility: HOSPITAL | Age: 68
DRG: 343 | End: 2023-05-30
Payer: MEDICARE

## 2023-05-30 DIAGNOSIS — K37 APPENDICITIS: ICD-10-CM

## 2023-05-30 PROBLEM — K35.80 ACUTE APPENDICITIS: Status: ACTIVE | Noted: 2023-05-30

## 2023-05-30 PROCEDURE — 99223 1ST HOSP IP/OBS HIGH 75: CPT | Performed by: INTERNAL MEDICINE

## 2023-05-30 RX ORDER — ESOMEPRAZOLE MAGNESIUM 40 MG/1
40 CAPSULE, DELAYED RELEASE ORAL
COMMUNITY

## 2023-05-30 RX ORDER — FLUTICASONE PROPIONATE 50 MCG
1 SPRAY, SUSPENSION (ML) NASAL DAILY
COMMUNITY

## 2023-05-30 RX ORDER — METOCLOPRAMIDE HYDROCHLORIDE 5 MG/ML
10 INJECTION INTRAMUSCULAR; INTRAVENOUS EVERY 8 HOURS PRN
Status: DISCONTINUED | OUTPATIENT
Start: 2023-05-30 | End: 2023-05-31

## 2023-05-30 RX ORDER — ONDANSETRON 2 MG/ML
4 INJECTION INTRAMUSCULAR; INTRAVENOUS EVERY 6 HOURS PRN
Status: DISCONTINUED | OUTPATIENT
Start: 2023-05-30 | End: 2023-05-31

## 2023-05-30 RX ORDER — ACYCLOVIR 800 MG/1
800 TABLET ORAL 2 TIMES DAILY
Status: DISCONTINUED | OUTPATIENT
Start: 2023-05-31 | End: 2023-05-31

## 2023-05-30 RX ORDER — ABACAVIR AND LAMIVUDINE 600; 300 MG/1; MG/1
1 TABLET, FILM COATED ORAL DAILY
Status: DISCONTINUED | OUTPATIENT
Start: 2023-05-31 | End: 2023-05-31

## 2023-05-30 RX ORDER — SODIUM CHLORIDE 9 MG/ML
INJECTION, SOLUTION INTRAVENOUS CONTINUOUS
Status: DISCONTINUED | OUTPATIENT
Start: 2023-05-30 | End: 2023-05-31

## 2023-05-30 RX ORDER — ENOXAPARIN SODIUM 100 MG/ML
40 INJECTION SUBCUTANEOUS DAILY
Status: DISCONTINUED | OUTPATIENT
Start: 2023-05-31 | End: 2023-05-31

## 2023-05-30 RX ORDER — SULFAMETHOXAZOLE AND TRIMETHOPRIM 800; 160 MG/1; MG/1
1 TABLET ORAL
Status: DISCONTINUED | OUTPATIENT
Start: 2023-05-31 | End: 2023-05-31

## 2023-05-30 RX ORDER — HYDROMORPHONE HYDROCHLORIDE 1 MG/ML
0.8 INJECTION, SOLUTION INTRAMUSCULAR; INTRAVENOUS; SUBCUTANEOUS EVERY 2 HOUR PRN
Status: DISCONTINUED | OUTPATIENT
Start: 2023-05-30 | End: 2023-05-31

## 2023-05-30 RX ORDER — HYDROMORPHONE HYDROCHLORIDE 1 MG/ML
0.4 INJECTION, SOLUTION INTRAMUSCULAR; INTRAVENOUS; SUBCUTANEOUS EVERY 2 HOUR PRN
Status: DISCONTINUED | OUTPATIENT
Start: 2023-05-30 | End: 2023-05-31

## 2023-05-30 RX ORDER — HYDROMORPHONE HYDROCHLORIDE 1 MG/ML
0.2 INJECTION, SOLUTION INTRAMUSCULAR; INTRAVENOUS; SUBCUTANEOUS EVERY 2 HOUR PRN
Status: DISCONTINUED | OUTPATIENT
Start: 2023-05-30 | End: 2023-05-31

## 2023-05-31 VITALS
HEIGHT: 72 IN | HEART RATE: 68 BPM | DIASTOLIC BLOOD PRESSURE: 87 MMHG | OXYGEN SATURATION: 96 % | RESPIRATION RATE: 18 BRPM | WEIGHT: 167 LBS | TEMPERATURE: 99 F | BODY MASS INDEX: 22.62 KG/M2 | SYSTOLIC BLOOD PRESSURE: 157 MMHG

## 2023-05-31 LAB
ANION GAP SERPL CALC-SCNC: 5 MMOL/L (ref 0–18)
BUN BLD-MCNC: 22 MG/DL (ref 7–18)
CALCIUM BLD-MCNC: 8.9 MG/DL (ref 8.5–10.1)
CHLORIDE SERPL-SCNC: 107 MMOL/L (ref 98–112)
CO2 SERPL-SCNC: 24 MMOL/L (ref 21–32)
CREAT BLD-MCNC: 0.98 MG/DL
ERYTHROCYTE [DISTWIDTH] IN BLOOD BY AUTOMATED COUNT: 12.4 %
GFR SERPLBLD BASED ON 1.73 SQ M-ARVRAT: 83 ML/MIN/1.73M2 (ref 60–?)
GLUCOSE BLD-MCNC: 169 MG/DL (ref 70–99)
HCT VFR BLD AUTO: 35.2 %
HGB BLD-MCNC: 12 G/DL
MCH RBC QN AUTO: 34.9 PG (ref 26–34)
MCHC RBC AUTO-ENTMCNC: 34.1 G/DL (ref 31–37)
MCV RBC AUTO: 102.3 FL
OSMOLALITY SERPL CALC.SUM OF ELEC: 289 MOSM/KG (ref 275–295)
PLATELET # BLD AUTO: 149 10(3)UL (ref 150–450)
POTASSIUM SERPL-SCNC: 4 MMOL/L (ref 3.5–5.1)
RBC # BLD AUTO: 3.44 X10(6)UL
SODIUM SERPL-SCNC: 136 MMOL/L (ref 136–145)
WBC # BLD AUTO: 6.5 X10(3) UL (ref 4–11)

## 2023-05-31 PROCEDURE — 3E0T3BZ INTRODUCTION OF ANESTHETIC AGENT INTO PERIPHERAL NERVES AND PLEXI, PERCUTANEOUS APPROACH: ICD-10-PCS | Performed by: ANESTHESIOLOGY

## 2023-05-31 PROCEDURE — 44970 LAPAROSCOPY APPENDECTOMY: CPT | Performed by: STUDENT IN AN ORGANIZED HEALTH CARE EDUCATION/TRAINING PROGRAM

## 2023-05-31 PROCEDURE — 99222 1ST HOSP IP/OBS MODERATE 55: CPT | Performed by: STUDENT IN AN ORGANIZED HEALTH CARE EDUCATION/TRAINING PROGRAM

## 2023-05-31 PROCEDURE — 99239 HOSP IP/OBS DSCHRG MGMT >30: CPT | Performed by: INTERNAL MEDICINE

## 2023-05-31 PROCEDURE — 0DTJ4ZZ RESECTION OF APPENDIX, PERCUTANEOUS ENDOSCOPIC APPROACH: ICD-10-PCS | Performed by: STUDENT IN AN ORGANIZED HEALTH CARE EDUCATION/TRAINING PROGRAM

## 2023-05-31 RX ORDER — OXYCODONE HYDROCHLORIDE 5 MG/1
5 TABLET ORAL EVERY 4 HOURS PRN
Status: DISCONTINUED | OUTPATIENT
Start: 2023-05-31 | End: 2023-05-31

## 2023-05-31 RX ORDER — HYDROMORPHONE HYDROCHLORIDE 1 MG/ML
0.4 INJECTION, SOLUTION INTRAMUSCULAR; INTRAVENOUS; SUBCUTANEOUS EVERY 5 MIN PRN
Status: DISCONTINUED | OUTPATIENT
Start: 2023-05-31 | End: 2023-05-31 | Stop reason: HOSPADM

## 2023-05-31 RX ORDER — HYDROMORPHONE HYDROCHLORIDE 1 MG/ML
0.2 INJECTION, SOLUTION INTRAMUSCULAR; INTRAVENOUS; SUBCUTANEOUS EVERY 5 MIN PRN
Status: DISCONTINUED | OUTPATIENT
Start: 2023-05-31 | End: 2023-05-31 | Stop reason: HOSPADM

## 2023-05-31 RX ORDER — PHENYLEPHRINE HCL 10 MG/ML
VIAL (ML) INJECTION AS NEEDED
Status: DISCONTINUED | OUTPATIENT
Start: 2023-05-31 | End: 2023-05-31 | Stop reason: SURG

## 2023-05-31 RX ORDER — ONDANSETRON 2 MG/ML
4 INJECTION INTRAMUSCULAR; INTRAVENOUS EVERY 6 HOURS PRN
Status: DISCONTINUED | OUTPATIENT
Start: 2023-05-31 | End: 2023-05-31 | Stop reason: HOSPADM

## 2023-05-31 RX ORDER — SODIUM CHLORIDE, SODIUM LACTATE, POTASSIUM CHLORIDE, CALCIUM CHLORIDE 600; 310; 30; 20 MG/100ML; MG/100ML; MG/100ML; MG/100ML
INJECTION, SOLUTION INTRAVENOUS CONTINUOUS
Status: DISCONTINUED | OUTPATIENT
Start: 2023-05-31 | End: 2023-05-31 | Stop reason: HOSPADM

## 2023-05-31 RX ORDER — ROCURONIUM BROMIDE 10 MG/ML
INJECTION, SOLUTION INTRAVENOUS AS NEEDED
Status: DISCONTINUED | OUTPATIENT
Start: 2023-05-31 | End: 2023-05-31 | Stop reason: SURG

## 2023-05-31 RX ORDER — ONDANSETRON 2 MG/ML
INJECTION INTRAMUSCULAR; INTRAVENOUS AS NEEDED
Status: DISCONTINUED | OUTPATIENT
Start: 2023-05-31 | End: 2023-05-31 | Stop reason: SURG

## 2023-05-31 RX ORDER — SODIUM CHLORIDE, SODIUM LACTATE, POTASSIUM CHLORIDE, CALCIUM CHLORIDE 600; 310; 30; 20 MG/100ML; MG/100ML; MG/100ML; MG/100ML
INJECTION, SOLUTION INTRAVENOUS CONTINUOUS PRN
Status: DISCONTINUED | OUTPATIENT
Start: 2023-05-31 | End: 2023-05-31 | Stop reason: SURG

## 2023-05-31 RX ORDER — NALOXONE HYDROCHLORIDE 0.4 MG/ML
80 INJECTION, SOLUTION INTRAMUSCULAR; INTRAVENOUS; SUBCUTANEOUS AS NEEDED
Status: DISCONTINUED | OUTPATIENT
Start: 2023-05-31 | End: 2023-05-31 | Stop reason: HOSPADM

## 2023-05-31 RX ORDER — LIDOCAINE HYDROCHLORIDE 10 MG/ML
INJECTION, SOLUTION EPIDURAL; INFILTRATION; INTRACAUDAL; PERINEURAL AS NEEDED
Status: DISCONTINUED | OUTPATIENT
Start: 2023-05-31 | End: 2023-05-31 | Stop reason: SURG

## 2023-05-31 RX ORDER — HYDROMORPHONE HYDROCHLORIDE 1 MG/ML
0.6 INJECTION, SOLUTION INTRAMUSCULAR; INTRAVENOUS; SUBCUTANEOUS EVERY 5 MIN PRN
Status: DISCONTINUED | OUTPATIENT
Start: 2023-05-31 | End: 2023-05-31 | Stop reason: HOSPADM

## 2023-05-31 RX ORDER — CEFOXITIN 2 G/1
INJECTION, POWDER, FOR SOLUTION INTRAVENOUS AS NEEDED
Status: DISCONTINUED | OUTPATIENT
Start: 2023-05-31 | End: 2023-05-31 | Stop reason: SURG

## 2023-05-31 RX ORDER — OXYCODONE HYDROCHLORIDE 5 MG/1
2.5 TABLET ORAL EVERY 4 HOURS PRN
Status: DISCONTINUED | OUTPATIENT
Start: 2023-05-31 | End: 2023-05-31

## 2023-05-31 RX ORDER — ACETAMINOPHEN 500 MG
1000 TABLET ORAL EVERY 8 HOURS SCHEDULED
Status: DISCONTINUED | OUTPATIENT
Start: 2023-05-31 | End: 2023-05-31

## 2023-05-31 RX ORDER — EPHEDRINE SULFATE 50 MG/ML
INJECTION INTRAVENOUS AS NEEDED
Status: DISCONTINUED | OUTPATIENT
Start: 2023-05-31 | End: 2023-05-31 | Stop reason: SURG

## 2023-05-31 RX ORDER — METOCLOPRAMIDE HYDROCHLORIDE 5 MG/ML
10 INJECTION INTRAMUSCULAR; INTRAVENOUS EVERY 8 HOURS PRN
Status: DISCONTINUED | OUTPATIENT
Start: 2023-05-31 | End: 2023-05-31 | Stop reason: HOSPADM

## 2023-05-31 RX ORDER — KETOROLAC TROMETHAMINE 30 MG/ML
INJECTION, SOLUTION INTRAMUSCULAR; INTRAVENOUS AS NEEDED
Status: DISCONTINUED | OUTPATIENT
Start: 2023-05-31 | End: 2023-05-31 | Stop reason: SURG

## 2023-05-31 RX ORDER — HYDROMORPHONE HYDROCHLORIDE 1 MG/ML
0.2 INJECTION, SOLUTION INTRAMUSCULAR; INTRAVENOUS; SUBCUTANEOUS EVERY 2 HOUR PRN
Status: DISCONTINUED | OUTPATIENT
Start: 2023-05-31 | End: 2023-05-31

## 2023-05-31 RX ORDER — DEXAMETHASONE SODIUM PHOSPHATE 4 MG/ML
VIAL (ML) INJECTION AS NEEDED
Status: DISCONTINUED | OUTPATIENT
Start: 2023-05-31 | End: 2023-05-31 | Stop reason: SURG

## 2023-05-31 RX ORDER — BUPIVACAINE HYDROCHLORIDE 2.5 MG/ML
INJECTION, SOLUTION EPIDURAL; INFILTRATION; INTRACAUDAL AS NEEDED
Status: DISCONTINUED | OUTPATIENT
Start: 2023-05-31 | End: 2023-05-31 | Stop reason: HOSPADM

## 2023-05-31 RX ORDER — HYDROMORPHONE HYDROCHLORIDE 1 MG/ML
0.4 INJECTION, SOLUTION INTRAMUSCULAR; INTRAVENOUS; SUBCUTANEOUS EVERY 2 HOUR PRN
Status: DISCONTINUED | OUTPATIENT
Start: 2023-05-31 | End: 2023-05-31

## 2023-05-31 RX ADMIN — PHENYLEPHRINE HCL 100 MCG: 10 MG/ML VIAL (ML) INJECTION at 00:30:00

## 2023-05-31 RX ADMIN — SODIUM CHLORIDE, SODIUM LACTATE, POTASSIUM CHLORIDE, CALCIUM CHLORIDE: 600; 310; 30; 20 INJECTION, SOLUTION INTRAVENOUS at 01:36:00

## 2023-05-31 RX ADMIN — LIDOCAINE HYDROCHLORIDE 100 MG: 10 INJECTION, SOLUTION EPIDURAL; INFILTRATION; INTRACAUDAL; PERINEURAL at 00:15:00

## 2023-05-31 RX ADMIN — KETOROLAC TROMETHAMINE 30 MG: 30 INJECTION, SOLUTION INTRAMUSCULAR; INTRAVENOUS at 01:24:00

## 2023-05-31 RX ADMIN — EPHEDRINE SULFATE 10 MG: 50 INJECTION INTRAVENOUS at 00:25:00

## 2023-05-31 RX ADMIN — PHENYLEPHRINE HCL 100 MCG: 10 MG/ML VIAL (ML) INJECTION at 00:36:00

## 2023-05-31 RX ADMIN — SODIUM CHLORIDE, SODIUM LACTATE, POTASSIUM CHLORIDE, CALCIUM CHLORIDE: 600; 310; 30; 20 INJECTION, SOLUTION INTRAVENOUS at 00:11:00

## 2023-05-31 RX ADMIN — DEXAMETHASONE SODIUM PHOSPHATE 8 MG: 4 MG/ML VIAL (ML) INJECTION at 00:22:00

## 2023-05-31 RX ADMIN — ONDANSETRON 4 MG: 2 INJECTION INTRAMUSCULAR; INTRAVENOUS at 00:27:00

## 2023-05-31 RX ADMIN — CEFOXITIN 2 G: 2 INJECTION, POWDER, FOR SOLUTION INTRAVENOUS at 00:22:00

## 2023-05-31 RX ADMIN — ROCURONIUM BROMIDE 20 MG: 10 INJECTION, SOLUTION INTRAVENOUS at 00:21:00

## 2023-05-31 NOTE — PLAN OF CARE
NURSING ADMISSION NOTE      Patient admitted via Cart from [address] to room. Safety precautions initiated. Bed in low position. Call light in reach.

## 2023-05-31 NOTE — ANESTHESIA PROCEDURE NOTES
Airway  Date/Time: 5/31/2023 12:19 AM  Urgency: elective    Airway not difficult    General Information and Staff    Patient location during procedure: OR  Anesthesiologist: Mary Lujan MD  Performed: anesthesiologist   Performed by: Mary Lujan MD  Authorized by: Mary Lujan MD      Indications and Patient Condition  Indications for airway management: anesthesia  Spontaneous Ventilation: absent  Sedation level: deep  Preoxygenated: yes  Patient position: sniffing  Mask difficulty assessment: 1 - vent by mask (Easy mask ventilation)  Planned trial extubation    Final Airway Details  Final airway type: endotracheal airway      Successful airway: ETT  Cuffed: yes   Successful intubation technique: flexible bronchoscopy  Endotracheal tube insertion site: oral  ETT size (mm): 7.0    Placement verified by: chest auscultation and capnometry   Measured from: lips  ETT to lips (cm): 22  Number of attempts at approach: 1    Additional Comments  Easy asleep fiberoptic intubation.  Atraumatic ETT placement-

## 2023-05-31 NOTE — PROGRESS NOTES
Alert & oriented x4. Denies pain. Lap.sites x3 with dermabond c/d/i. No flatus but belching. With PEG tube intact. Patient bolus himself with Osmolite 1.5 480 ml and water flush 240 ml by gravity . Up in room and hallway. Plan of care discussed with patient. Will monitor.

## 2023-05-31 NOTE — PLAN OF CARE
A&Ox4. Blind in Left eye. Hearing aids. Hypertensive, see Flowsheets. MD aware. RA. Denies chest pain and SOB. GI: Abdomen soft, nondistended. RUQ tenderness. Denies flatus at this time. Gastrostomy tube in place. Denies nausea. : Voids. Pain controlled at this time. Up ad yousif. Drains: none  Incisions: Healing scar incisions from prior procedures. Left subclavian Port-a-cath, not accessed. Diet: Strict NPO prior to procedure  S/L, 20G right PIV placed by outside hospital staff  All appropriate safety measures in place. All questions and concerns addressed. Family at bedside. MD consults placed. OSH chart and imaging disc placed in patient chart folder. Dietary needs per patient:  2cal HN bolus. x2 8oz, 3 times a day.    OR Osomolite 1.5 if unavailable

## 2023-05-31 NOTE — PROGRESS NOTES
NURSING DISCHARGE NOTE    Discharged Home via Wheelchair. Accompanied by Support staff  Belongings Taken by patient/family     Verbal and written discharge instructions given to patient. Verbalized understanding. Denies pain at this time. To home in stable condition. Romana Danielson

## 2023-05-31 NOTE — OPERATIVE REPORT
OPERATIVE NOTE    Sheryle Rancher Location: OR   MANNY [id number] MRN [id number]   Admission Date 5/30/2023 Operation Date 5/31/2023   Attending Physician Ron Patel MD Operating Physician Endy Schofield MD     PREOPERATIVE DIAGNOSIS  Acute appendicitis    POSTOPERATIVE DIAGNOSIS  Acute uncomplicated appendicitis    PROCEDURE PERFORMED  Laparoscopic appendectomy  Transversus abdominis plane block    SURGEON  Endy Schofield MD    ASSISTANTS  None    ANESTHESIA  General    FINDINGS   Inflamed and dilated appendix with normal tip, no purulence or evidence of perforation    INDICATIONS  69year old male presented to hospital with 1 day history of abdominal pain. CT imaging revealed dilated appendix consistent with acute appendicitis. Patient had localized peritonitis on exam. Surgery was indicated. DESCRIPTION OF PROCEDURE  After informed consent, patient was brought to the operating room and placed in supine position. Bilateral SCDs were placed and pre-operative antibiotics administered. Anesthesia was induced without any complication. Patient was prepped and draped in the usual sterile fashion. Time out was performed confirming patient, procedure, and site. Patient had multiple prior surgeries, including a midline laparotomy and G-tube placement. It was decided to go left lower quadrant for access. The Veress needle was used to gain pneumoperitoneum. Using blade, an incision for 5 mm port in the left lower quadrant was made. Veress needle was inserted with audible clicks. Pneumoperitoneum was created with CO2. A 5 mm port was placed. Upon entrance, no injury was noted to omentum or bowel at site of entrance. Patient has significant abdominal adhesions to the midline as well as in the left upper quadrant. A second 5 mm port was placed in the right side at the level of the umbilicus under direct visualization.   A 12 mm port was placed the vertical incision at the infraumbilical site away from all the adhesions. Transversus abdominis plane block was performed. Using graspers, bowel was retracted and the appendix identified. Appendix was dilated and inflamed, no evidence of perforation of purulence. The base of the appendix was identified and a window created. Using endo YULI stapler with blue load, the appendix was divided. Using a white load, the meoappendix was divided. Staple line at cecum and mesoappendix were both hemostatic after. Abdomen was irrigated. Appendix was placed in endocatch bag and removed from abdomen for pathology. Pneumoperitoneum was evacuated. Fascia at umbilicus was closed with o vicryl suture. Skin incisions were closed with 4-o monocryl. Incisions were washed and dressed with dermabond. At the end of the case, all counts were correct. Patient tolerated procedure well. Patient was awakened and transferred to PACU in a hemodynamically stable condition.     SPECIMENS REMOVED  Appendix    ESTIMATED BLOOD LOSS  5 ml    COMPLICATIONS  None     Joselin Walker MD

## 2023-05-31 NOTE — PLAN OF CARE
Problem: PAIN - ADULT  Goal: Verbalizes/displays adequate comfort level or patient's stated pain goal  Description: INTERVENTIONS:  - Encourage pt to monitor pain and request assistance  - Assess pain using appropriate pain scale  - Administer analgesics based on type and severity of pain and evaluate response  - Implement non-pharmacological measures as appropriate and evaluate response  - Consider cultural and social influences on pain and pain management  - Manage/alleviate anxiety  - Utilize distraction and/or relaxation techniques  - Monitor for opioid side effects  - Notify MD/LIP if interventions unsuccessful or patient reports new pain  - Anticipate increased pain with activity and pre-medicate as appropriate  Outcome: Progressing     Problem: RISK FOR INFECTION - ADULT  Goal: Absence of fever/infection during anticipated neutropenic period  Description: INTERVENTIONS  - Monitor WBC  - Administer growth factors as ordered  - Implement neutropenic guidelines  Outcome: Progressing     Problem: SAFETY ADULT - FALL  Goal: Free from fall injury  Description: INTERVENTIONS:  - Assess pt frequently for physical needs  - Identify cognitive and physical deficits and behaviors that affect risk of falls.   - [place] fall precautions as indicated by assessment.  - Educate pt/family on patient safety including physical limitations  - Instruct pt to call for assistance with activity based on assessment  - Modify environment to reduce risk of injury  - Provide assistive devices as appropriate  - Consider OT/PT consult to assist with strengthening/mobility  - Encourage toileting schedule  Outcome: Progressing     Problem: DISCHARGE PLANNING  Goal: Discharge to home or other facility with appropriate resources  Description: INTERVENTIONS:  - Identify barriers to discharge w/pt and caregiver  - Include patient/family/discharge partner in discharge planning  - Arrange for needed discharge resources and transportation as appropriate  - Identify discharge learning needs (meds, wound care, etc)  - Arrange for interpreters to assist at discharge as needed  - Consider post-discharge preferences of patient/family/discharge partner  - Complete POLST form as appropriate  - Assess patient's ability to be responsible for managing their own health  - Refer to Case Management Department for coordinating discharge planning if the patient needs post-hospital services based on physician/LIP order or complex needs related to functional status, cognitive ability or social support system  Outcome: Progressing     Problem: Patient/Family Goals  Goal: Patient/Family Long Term Goal  Description: Patient's Long Term Goal: Discharge    Interventions:  - Understand post-surgical lap appy discharge care  - Return to GI baseline  - Recognize signs of infection, increased levels of pain, and when to call the office  - Understand use pain medications, rationale for use  - See additional Care Plan goals for specific interventions  Outcome: Progressing  Goal: Patient/Family Short Term Goal  Description: Patient's Short Term Goal: Pain management    Interventions:    - Verbalize and describe pain  - Understand rationale for use of certain pain medications  - See additional Care Plan goals for specific interventions  Outcome: Progressing

## 2023-06-28 ENCOUNTER — APPOINTMENT (OUTPATIENT)
Dept: GENERAL RADIOLOGY | Facility: HOSPITAL | Age: 68
End: 2023-06-28
Attending: EMERGENCY MEDICINE
Payer: MEDICARE

## 2023-06-28 ENCOUNTER — HOSPITAL ENCOUNTER (INPATIENT)
Facility: HOSPITAL | Age: 68
LOS: 2 days | Discharge: HOME | End: 2023-06-30
Attending: EMERGENCY MEDICINE | Admitting: HOSPITALIST
Payer: MEDICARE

## 2023-06-28 DIAGNOSIS — E87.1 HYPONATREMIA: Primary | ICD-10-CM

## 2023-06-28 DIAGNOSIS — R00.1 JUNCTIONAL BRADYCARDIA: ICD-10-CM

## 2023-06-28 DIAGNOSIS — I48.0 PAROXYSMAL ATRIAL FIBRILLATION WITH RAPID VENTRICULAR RESPONSE (HCC): ICD-10-CM

## 2023-06-28 LAB
ALBUMIN SERPL-MCNC: 3.7 G/DL (ref 3.4–5)
ALBUMIN/GLOB SERPL: 0.8 {RATIO} (ref 1–2)
ALP LIVER SERPL-CCNC: 193 U/L
ALT SERPL-CCNC: 58 U/L
ANION GAP SERPL CALC-SCNC: 4 MMOL/L (ref 0–18)
APTT PPP: 28.5 SECONDS (ref 23.3–35.6)
AST SERPL-CCNC: 54 U/L (ref 15–37)
ATRIAL RATE: 76 BPM
BASOPHILS # BLD AUTO: 0.02 X10(3) UL (ref 0–0.2)
BASOPHILS NFR BLD AUTO: 0.5 %
BILIRUB SERPL-MCNC: 0.4 MG/DL (ref 0.1–2)
BUN BLD-MCNC: 32 MG/DL (ref 7–18)
CALCIUM BLD-MCNC: 9.6 MG/DL (ref 8.5–10.1)
CHLORIDE SERPL-SCNC: 97 MMOL/L (ref 98–112)
CO2 SERPL-SCNC: 25 MMOL/L (ref 21–32)
CREAT BLD-MCNC: 1.33 MG/DL
CREAT UR-SCNC: 32.2 MG/DL
D DIMER PPP FEU-MCNC: 0.67 UG/ML FEU (ref ?–0.69)
EOSINOPHIL # BLD AUTO: 0.07 X10(3) UL (ref 0–0.7)
EOSINOPHIL NFR BLD AUTO: 1.8 %
ERYTHROCYTE [DISTWIDTH] IN BLOOD BY AUTOMATED COUNT: 13 %
GFR SERPLBLD BASED ON 1.73 SQ M-ARVRAT: 58 ML/MIN/1.73M2 (ref 60–?)
GLOBULIN PLAS-MCNC: 4.8 G/DL (ref 2.8–4.4)
GLUCOSE BLD-MCNC: 135 MG/DL (ref 70–99)
HCT VFR BLD AUTO: 37.3 %
HGB BLD-MCNC: 12.8 G/DL
IMM GRANULOCYTES # BLD AUTO: 0.02 X10(3) UL (ref 0–1)
IMM GRANULOCYTES NFR BLD: 0.5 %
INR BLD: 1.02 (ref 0.85–1.16)
LYMPHOCYTES # BLD AUTO: 1.15 X10(3) UL (ref 1–4)
LYMPHOCYTES NFR BLD AUTO: 28.8 %
MAGNESIUM SERPL-MCNC: 2.5 MG/DL (ref 1.6–2.6)
MCH RBC QN AUTO: 35.3 PG (ref 26–34)
MCHC RBC AUTO-ENTMCNC: 34.3 G/DL (ref 31–37)
MCV RBC AUTO: 102.8 FL
MONOCYTES # BLD AUTO: 0.46 X10(3) UL (ref 0.1–1)
MONOCYTES NFR BLD AUTO: 11.5 %
NEUTROPHILS # BLD AUTO: 2.27 X10 (3) UL (ref 1.5–7.7)
NEUTROPHILS # BLD AUTO: 2.27 X10(3) UL (ref 1.5–7.7)
NEUTROPHILS NFR BLD AUTO: 56.9 %
NT-PROBNP SERPL-MCNC: 768 PG/ML (ref ?–125)
OSMOLALITY SERPL CALC.SUM OF ELEC: 271 MOSM/KG (ref 275–295)
OSMOLALITY SERPL: 284 MOSM/KG (ref 280–300)
P AXIS: 17 DEGREES
P-R INTERVAL: 178 MS
PLATELET # BLD AUTO: 242 10(3)UL (ref 150–450)
POTASSIUM SERPL-SCNC: 4.6 MMOL/L (ref 3.5–5.1)
PROT SERPL-MCNC: 8.5 G/DL (ref 6.4–8.2)
PROTHROMBIN TIME: 13.4 SECONDS (ref 11.6–14.8)
Q-T INTERVAL: 418 MS
Q-T INTERVAL: 458 MS
QRS DURATION: 90 MS
QRS DURATION: 92 MS
QTC CALCULATION (BEZET): 434 MS
QTC CALCULATION (BEZET): 470 MS
R AXIS: 25 DEGREES
R AXIS: 5 DEGREES
RBC # BLD AUTO: 3.63 X10(6)UL
SODIUM SERPL-SCNC: 126 MMOL/L (ref 136–145)
SODIUM SERPL-SCNC: 26 MMOL/L
T AXIS: 24 DEGREES
T AXIS: 3 DEGREES
T4 FREE SERPL-MCNC: 1 NG/DL (ref 0.8–1.7)
TROPONIN I HIGH SENSITIVITY: 9 NG/L
TSI SER-ACNC: 2.42 MIU/ML (ref 0.36–3.74)
VENTRICULAR RATE: 54 BPM
VENTRICULAR RATE: 76 BPM
WBC # BLD AUTO: 4 X10(3) UL (ref 4–11)

## 2023-06-28 PROCEDURE — 99223 1ST HOSP IP/OBS HIGH 75: CPT | Performed by: HOSPITALIST

## 2023-06-28 PROCEDURE — 71045 X-RAY EXAM CHEST 1 VIEW: CPT | Performed by: EMERGENCY MEDICINE

## 2023-06-28 RX ORDER — ENOXAPARIN SODIUM 100 MG/ML
40 INJECTION SUBCUTANEOUS EVERY EVENING
Status: DISCONTINUED | OUTPATIENT
Start: 2023-06-28 | End: 2023-06-30

## 2023-06-28 RX ORDER — ECHINACEA PURPUREA EXTRACT 125 MG
1 TABLET ORAL
Status: DISCONTINUED | OUTPATIENT
Start: 2023-06-28 | End: 2023-06-30

## 2023-06-28 RX ORDER — MELATONIN
3 NIGHTLY PRN
Status: DISCONTINUED | OUTPATIENT
Start: 2023-06-28 | End: 2023-06-30

## 2023-06-28 RX ORDER — BENZONATATE 100 MG/1
200 CAPSULE ORAL 3 TIMES DAILY PRN
Status: DISCONTINUED | OUTPATIENT
Start: 2023-06-28 | End: 2023-06-30

## 2023-06-28 RX ORDER — ONDANSETRON 2 MG/ML
8 INJECTION INTRAMUSCULAR; INTRAVENOUS EVERY 6 HOURS PRN
Status: DISCONTINUED | OUTPATIENT
Start: 2023-06-28 | End: 2023-06-30

## 2023-06-28 RX ORDER — POLYETHYLENE GLYCOL 3350 17 G/17G
17 POWDER, FOR SOLUTION ORAL DAILY PRN
Status: DISCONTINUED | OUTPATIENT
Start: 2023-06-28 | End: 2023-06-30

## 2023-06-28 RX ORDER — SODIUM CHLORIDE 9 MG/ML
INJECTION, SOLUTION INTRAVENOUS CONTINUOUS
Status: DISCONTINUED | OUTPATIENT
Start: 2023-06-28 | End: 2023-06-29

## 2023-06-28 RX ORDER — ABACAVIR AND LAMIVUDINE 600; 300 MG/1; MG/1
1 TABLET, FILM COATED ORAL DAILY
Status: DISCONTINUED | OUTPATIENT
Start: 2023-06-29 | End: 2023-06-30

## 2023-06-28 RX ORDER — SENNOSIDES 8.6 MG
17.2 TABLET ORAL NIGHTLY PRN
Status: DISCONTINUED | OUTPATIENT
Start: 2023-06-28 | End: 2023-06-30

## 2023-06-28 RX ORDER — FLUTICASONE PROPIONATE 50 MCG
1 SPRAY, SUSPENSION (ML) NASAL DAILY
Status: DISCONTINUED | OUTPATIENT
Start: 2023-06-28 | End: 2023-06-30

## 2023-06-28 RX ORDER — LEVOTHYROXINE SODIUM 88 UG/1
88 TABLET ORAL
Status: DISCONTINUED | OUTPATIENT
Start: 2023-06-29 | End: 2023-06-30

## 2023-06-28 RX ORDER — ACETAMINOPHEN 500 MG
500 TABLET ORAL EVERY 4 HOURS PRN
Status: DISCONTINUED | OUTPATIENT
Start: 2023-06-28 | End: 2023-06-30

## 2023-06-28 RX ORDER — FAMOTIDINE 20 MG/1
40 TABLET, FILM COATED ORAL DAILY
Status: DISCONTINUED | OUTPATIENT
Start: 2023-06-29 | End: 2023-06-30

## 2023-06-28 RX ORDER — BISACODYL 10 MG
10 SUPPOSITORY, RECTAL RECTAL
Status: DISCONTINUED | OUTPATIENT
Start: 2023-06-28 | End: 2023-06-30

## 2023-06-28 RX ORDER — ZOLPIDEM TARTRATE 5 MG/1
10 TABLET ORAL NIGHTLY PRN
Status: DISCONTINUED | OUTPATIENT
Start: 2023-06-28 | End: 2023-06-30

## 2023-06-28 RX ORDER — ACYCLOVIR 800 MG/1
800 TABLET ORAL DAILY
Status: DISCONTINUED | OUTPATIENT
Start: 2023-06-29 | End: 2023-06-30

## 2023-06-28 NOTE — ED QUICK NOTES
Orders for admission, patient is aware of plan and ready to go upstairs. Any questions, please call ED RN Gonzalez Soliz at extension [phone].      Patient Covid vaccination status: Fully vaccinated     COVID Test Ordered in ED: None    COVID Suspicion at Admission: N/A    Running Infusions:  Normal saline    Mental Status/LOC at time of transport: A&O x 4    Other pertinent information:   CIWA score: N/A   NIH score:  N/A

## 2023-06-28 NOTE — ED INITIAL ASSESSMENT (HPI)
Pt reports he was discharged Saturday from hospital with holter monitor. Diagnosed with A fib. Patient c/o palpitations since he left.

## 2023-06-29 ENCOUNTER — APPOINTMENT (OUTPATIENT)
Dept: CV DIAGNOSTICS | Facility: HOSPITAL | Age: 68
End: 2023-06-29
Attending: HOSPITALIST
Payer: MEDICARE

## 2023-06-29 ENCOUNTER — APPOINTMENT (OUTPATIENT)
Dept: INTERVENTIONAL RADIOLOGY/VASCULAR | Facility: HOSPITAL | Age: 68
End: 2023-06-29
Attending: INTERNAL MEDICINE
Payer: MEDICARE

## 2023-06-29 PROBLEM — I49.5 TACHY-BRADY SYNDROME (HCC): Status: ACTIVE | Noted: 2023-06-29

## 2023-06-29 LAB
ANION GAP SERPL CALC-SCNC: 3 MMOL/L (ref 0–18)
BUN BLD-MCNC: 24 MG/DL (ref 7–18)
CALCIUM BLD-MCNC: 9.1 MG/DL (ref 8.5–10.1)
CHLORIDE SERPL-SCNC: 106 MMOL/L (ref 98–112)
CO2 SERPL-SCNC: 26 MMOL/L (ref 21–32)
CREAT BLD-MCNC: 1.03 MG/DL
ERYTHROCYTE [DISTWIDTH] IN BLOOD BY AUTOMATED COUNT: 13.1 %
GFR SERPLBLD BASED ON 1.73 SQ M-ARVRAT: 79 ML/MIN/1.73M2 (ref 60–?)
GLUCOSE BLD-MCNC: 100 MG/DL (ref 70–99)
GLUCOSE BLD-MCNC: 92 MG/DL (ref 70–99)
HCT VFR BLD AUTO: 33.9 %
HGB BLD-MCNC: 11.7 G/DL
MAGNESIUM SERPL-MCNC: 2.3 MG/DL (ref 1.6–2.6)
MCH RBC QN AUTO: 35.3 PG (ref 26–34)
MCHC RBC AUTO-ENTMCNC: 34.5 G/DL (ref 31–37)
MCV RBC AUTO: 102.4 FL
OSMOLALITY SERPL CALC.SUM OF ELEC: 284 MOSM/KG (ref 275–295)
PHOSPHATE SERPL-MCNC: 3.7 MG/DL (ref 2.5–4.9)
PLATELET # BLD AUTO: 198 10(3)UL (ref 150–450)
POTASSIUM SERPL-SCNC: 4.1 MMOL/L (ref 3.5–5.1)
RBC # BLD AUTO: 3.31 X10(6)UL
SODIUM SERPL-SCNC: 135 MMOL/L (ref 136–145)
WBC # BLD AUTO: 3.1 X10(3) UL (ref 4–11)

## 2023-06-29 PROCEDURE — 02H63JZ INSERTION OF PACEMAKER LEAD INTO RIGHT ATRIUM, PERCUTANEOUS APPROACH: ICD-10-PCS | Performed by: INTERNAL MEDICINE

## 2023-06-29 PROCEDURE — 93306 TTE W/DOPPLER COMPLETE: CPT | Performed by: HOSPITALIST

## 2023-06-29 PROCEDURE — 99232 SBSQ HOSP IP/OBS MODERATE 35: CPT | Performed by: INTERNAL MEDICINE

## 2023-06-29 PROCEDURE — 0JH606Z INSERTION OF PACEMAKER, DUAL CHAMBER INTO CHEST SUBCUTANEOUS TISSUE AND FASCIA, OPEN APPROACH: ICD-10-PCS | Performed by: INTERNAL MEDICINE

## 2023-06-29 PROCEDURE — B51NYZZ FLUOROSCOPY OF LEFT UPPER EXTREMITY VEINS USING OTHER CONTRAST: ICD-10-PCS | Performed by: INTERNAL MEDICINE

## 2023-06-29 PROCEDURE — 02HK3JZ INSERTION OF PACEMAKER LEAD INTO RIGHT VENTRICLE, PERCUTANEOUS APPROACH: ICD-10-PCS | Performed by: INTERNAL MEDICINE

## 2023-06-29 RX ORDER — CEFAZOLIN SODIUM/WATER 2 G/20 ML
2 SYRINGE (ML) INTRAVENOUS EVERY 8 HOURS
Status: COMPLETED | OUTPATIENT
Start: 2023-06-29 | End: 2023-06-30

## 2023-06-29 RX ORDER — ONDANSETRON 2 MG/ML
4 INJECTION INTRAMUSCULAR; INTRAVENOUS EVERY 6 HOURS PRN
Status: DISCONTINUED | OUTPATIENT
Start: 2023-06-29 | End: 2023-06-30

## 2023-06-29 RX ORDER — SULFAMETHOXAZOLE AND TRIMETHOPRIM 200; 40 MG/5ML; MG/5ML
20 SUSPENSION ORAL
Status: DISCONTINUED | OUTPATIENT
Start: 2023-06-30 | End: 2023-06-30

## 2023-06-29 RX ORDER — AMLODIPINE BESYLATE 10 MG/1
10 TABLET ORAL ONCE
Status: COMPLETED | OUTPATIENT
Start: 2023-06-29 | End: 2023-06-29

## 2023-06-29 RX ORDER — VANCOMYCIN HYDROCHLORIDE 1 G/20ML
INJECTION, POWDER, LYOPHILIZED, FOR SOLUTION INTRAVENOUS
Status: DISCONTINUED
Start: 2023-06-29 | End: 2023-06-29 | Stop reason: WASHOUT

## 2023-06-29 RX ORDER — MIDAZOLAM HYDROCHLORIDE 1 MG/ML
INJECTION INTRAMUSCULAR; INTRAVENOUS
Status: COMPLETED
Start: 2023-06-29 | End: 2023-06-29

## 2023-06-29 RX ORDER — LIDOCAINE HYDROCHLORIDE 10 MG/ML
INJECTION, SOLUTION EPIDURAL; INFILTRATION; INTRACAUDAL; PERINEURAL
Status: COMPLETED
Start: 2023-06-29 | End: 2023-06-29

## 2023-06-29 RX ORDER — CEFAZOLIN SODIUM/WATER 2 G/20 ML
SYRINGE (ML) INTRAVENOUS
Status: COMPLETED
Start: 2023-06-29 | End: 2023-06-29

## 2023-06-29 RX ORDER — DIPHENHYDRAMINE HYDROCHLORIDE 50 MG/ML
INJECTION INTRAMUSCULAR; INTRAVENOUS
Status: COMPLETED
Start: 2023-06-29 | End: 2023-06-29

## 2023-06-29 RX ORDER — IODIXANOL 320 MG/ML
100 INJECTION, SOLUTION INTRAVASCULAR
Status: DISCONTINUED | OUTPATIENT
Start: 2023-06-29 | End: 2023-06-29

## 2023-06-29 NOTE — PLAN OF CARE
NURSING ADMISSION NOTE      Patient admitted via Cart  Oriented to room. Safety precautions initiated. Bed in low position. Call light in reach.   Pt admitted from the NURSING ADMISSION NOTE      Patient admitted via Cart  Oriented to room. Safety precautions initiated. Bed in low position. Call light in reach. Pt from the ED . Placed on tele. SR, & Afib at times. Pt with no complaints. PEG tube feedings ordered for jessicat  Cardiac consult called.    Possible pacer tomorrow

## 2023-06-29 NOTE — PLAN OF CARE
Assumed care around 1900. A/o x4. RA. Heart rhythm is mostly NSR, will occasionally have runs of SB/junctional bigeminy and Afib. Bolus feeding x1 through PEG tube. NPO since midnight for procedure. IVF infusing. Voiding per urinal, up w/ standby assistance. Plan for possible pacemaker placement today. CHG cleansed. Currently resting in bed w/ call light within reach & safety precautions in place.

## 2023-06-29 NOTE — DISCHARGE INSTRUCTIONS
Pacemaker and Defibrillator Discharge Instructions    Activity  Plan to rest tonight and tomorrow. Avoid drinking alcohol for next 24 hours. Do not drive after procedure until 1-week device check appointment, unless otherwise instructed by your cardiologist.   Wear sling for next 24 hours, then only at night as needed for 30 days to help assist with arm restrictions while sleeping. Arm Restrictions: For 30 days after implant:  do not lift arm with implanted device above your head or behind your back. Arm is to remain below your shoulder and in front of your body. do not lift more than 10 lbs with affected arm   do not perform repetitive cycling motion with affected arm (swimming, golfing, bowling, tennis, exercise machines, raking, vacuuming, snow shoveling). Dr. Chris Johnson Only Patients: Do not perform repetitive cycling motion for 90 days total    Incision Care/Bathing  Keep incision site completely dry for 5 days after surgery. After day 5, you can remove top dressing and shower, letting clean soapy water run over incision area, patting dry. The white steri-strips placed directly over the incision will gradually fall off over the next few weeks and can be physically removed after 3 weeks. Do not take them off before this time. (If you have a zipline dressing, this will be removed by device nurse or MD in 4 weeks)   Keep procedure site clean and dry, do not apply any ointments, creams, lotions to the incision. Look at your incision daily to monitor for signs and symptoms of infection (redness, swelling, drainage, foul odor from procedure site)  Do not cover incision with extra dressings or gauze unless otherwise instructed. Do not submerge incision in water, take whirlpool baths or swim for 30 days or until site is completely healed.      When to notify your physician:   If you have chest pain, shortness of breath or persistent cough  If you experience any signs of fever (temperature >101), chills, infection (redness, swelling, thick yellow drainage, foul order from procedure site)  New or worsening swelling of arm with implanted device   If an ICD was inserted and you receive a shock and have no symptoms, call [facility] device clinic. If you have symptoms (fainting, near fainting, dizziness, lightheadedness, chest pain, shortness of breath, palpitations), call 911. [address] [address] Direct Line: [phone]. Monday-Friday.  8:30AM-4PM.   1 [facility] Office: [phone] Monday- Friday 8AM-5PM   [facility] Office: [phone] Monday-Friday 8AM-5PM

## 2023-06-29 NOTE — PROCEDURES
OPERATION(S) PERFORMED:   1. Dual-chamber pacemaker implant to preserve AV synchrony and prevent pacemaker syndrome. 2. Chest fluoroscopy. 3. Left upper extremity venography. 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  SEDATION: IV was maintained by RN. Patient was assessed by myself and the nursing staff, IV sedation (versed and fentanyl) were administered during continuous ECG, pulse oximeter, and non-invasive hemodynamic monitoring. I was present from the time of sedation being started to the end of the procedure ([id number]). METHODS: The patient was brought to the outpatient cardiac telemetry unit in a fasting and nonsedated state after providing informed consent. IV sedation was administered during continuous ECG, pulse oximeter, and noninvasive hemodynamic monitoring. After administering 1% lidocaine for local anesthesia, an incision was made parallel to the right deltopectoral groove. The plane of the incision was extended to the prepectoral fascia. A pocket was formed. Access to the axillary vein was achieved via the extrathoracic approach with a micropuncture wire with a single. 2 wires were inserted from the single access puncture. The guidewires were advanced to the IVC. The RV lead was placed in the RV apex. Local electrograms and pacing thresholds were measured. In a similar manner, an atrial lead was positioned in the RA appendage. Local electrograms and pacing thresholds were measured. Pacing was performed at 10 v to rule out phrenic nerve stimulation. The pocket was irrigated with antibiotic solution. Bleeding was sought for until hemostasis was achieved. The leads were connected to a pulse generator. They were coiled and placed into the pocket along with the pulse generator.  The incision was closed in 2 layers using 2-0 and 3-0 Vicryl. Steri-Strips were applied followed by a sterile dressing. The left arm was placed in a sling and the patient was transported to telemetry in stable condition. There were no apparent intraoperative complications. CONCLUSIONS:   1. Status post successful implant of a [employer] dual-chamber pacemaker with a active fixation right ventricular (RV) lead, active fixation RA lead:   2. Adequate RA, RV pacing and sensing thresholds.      Edwardo Brunson MD  [facility]  Cardiac Electrophysiolgy  [phone]

## 2023-06-30 ENCOUNTER — APPOINTMENT (OUTPATIENT)
Dept: GENERAL RADIOLOGY | Facility: HOSPITAL | Age: 68
End: 2023-06-30
Attending: INTERNAL MEDICINE
Payer: MEDICARE

## 2023-06-30 ENCOUNTER — APPOINTMENT (OUTPATIENT)
Dept: CV DIAGNOSTICS | Facility: HOSPITAL | Age: 68
End: 2023-06-30
Attending: NURSE PRACTITIONER
Payer: MEDICARE

## 2023-06-30 VITALS
OXYGEN SATURATION: 99 % | HEART RATE: 76 BPM | RESPIRATION RATE: 16 BRPM | SYSTOLIC BLOOD PRESSURE: 150 MMHG | DIASTOLIC BLOOD PRESSURE: 80 MMHG | BODY MASS INDEX: 21.67 KG/M2 | TEMPERATURE: 98 F | WEIGHT: 160 LBS | HEIGHT: 72 IN

## 2023-06-30 PROCEDURE — 93308 TTE F-UP OR LMTD: CPT | Performed by: NURSE PRACTITIONER

## 2023-06-30 PROCEDURE — 99239 HOSP IP/OBS DSCHRG MGMT >30: CPT | Performed by: INTERNAL MEDICINE

## 2023-06-30 PROCEDURE — 71046 X-RAY EXAM CHEST 2 VIEWS: CPT | Performed by: INTERNAL MEDICINE

## 2023-06-30 NOTE — PLAN OF CARE
Assumed care at 0730  A&Ox4, VSS, up adlib   Echo complete  Xray complete  PT eval complete  Tylenol given for pain   All meds given crushed through PEG tube   Medications, precriptions and AVS reviewed with patient- verbalized understanding   Home meds returned to patient   All questions answered       NURSING DISCHARGE NOTE    Discharged Home via Wheelchair. Accompanied by Support staff  Belongings Taken by patient/family.

## 2023-06-30 NOTE — PLAN OF CARE
Assumed care 1900  Patient alert, oriented x4  Fall precautions in place  Refused night time bolus feed  R pacemaker site clean dry intact  Tylenol for pain at site

## 2023-06-30 NOTE — PLAN OF CARE
Received care @ 0730  A&Ox4  RA, NS/SB on tele  Pacemaker placed; site CDI. No signs of bleeding. Sling in place  Amlodipine given for hypertension   Tube feedings started per dietary; patient tolerating well  Fluids dc per Hospitalist  Denies pain     POC discussed w/ patient and physicians.

## 2023-06-30 NOTE — PHYSICAL THERAPY NOTE
PHYSICAL THERAPY EVALUATION - INPATIENT     Room Number: 5953/5428-X  Evaluation Date: 6/30/2023  Type of Evaluation: Initial  Physician Order: PT Eval and Treat    Presenting Problem: afib, palpitations, fatigue, s/p PPM 6/29  Co-Morbidities : HIV, tonsillary CA, anal CA, afib  Reason for Therapy: Mobility Dysfunction and Discharge Planning    Previous Admissions:   5/30-5/31/2023: Appendecitis; laparoscopic appendectomy; not seen by therapy      ASSESSMENT   Pt is a 69year old male admitted on 6/28/2023 for afib, palpitations, fatigue, s/p PPM on R 6/29. Functional outcome measures completed include [facility]. The AM-PAC '6-Clicks' Inpatient Basic Mobility Short Form was completed and this patient is demonstrating a Approx Degree of Impairment: 0%  degree of impairment in mobility. Research supports that patients with this level of impairment may benefit from home. PT Discharge Recommendations: Home      PLAN  Patient has been evaluated and presents with no skilled Physical Therapy needs at this time. Patient discharged from Physical Therapy services. Please re-order if a new functional limitation presents during this admission. GOALS  Patient was able to achieve the following goals . .. Patient was able to transfer At previous, functional level   Patient able to ambulate on level surfaces At previous, functional level         HOME SITUATION  Type of Home: House   Home Layout: Two level  Stairs to Enter : 2  Railing: Yes  Stairs to Bedroom: 14  Railing: Yes    Lives With:  (Sister, FRANSISCO)  Drives: Yes  Patient Owned Equipment: Rolling walker       Prior Level of [place]: Pt typically independent with ADLs and mobility. Lives with sister and FRANSISCO who are both retired and are able to assist as needed. Likes to golf.      SUBJECTIVE  \"Felt good to do the stairs\"      OBJECTIVE  Precautions:  (s/p PPM on R)  Fall Risk: Standard fall risk    WEIGHT BEARING RESTRICTION  Weight Bearing Restriction: None PAIN ASSESSMENT  Ratin  Location: incision  Management Techniques: Repositioning    COGNITION  Overall Cognitive Status:  WFL - within functional limits    RANGE OF MOTION AND STRENGTH ASSESSMENT  Upper extremity ROM and strength are within functional limits except for the following:    R sh not tested s/p PPM    Lower extremity ROM is within functional limits     Lower extremity strength is within functional limits       BALANCE  Static Sitting: Fair  Dynamic Sitting: Fair  Static Standing: Fair  Dynamic Standing: Fair    ADDITIONAL TESTS                                    ACTIVITY TOLERANCE  Pulse: 76  Heart Rate Source: Monitor                   O2 WALK       NEUROLOGICAL FINDINGS                        AM-PAC '6-Clicks' INPATIENT SHORT FORM - BASIC MOBILITY  How much difficulty does the patient currently have. .. Patient Difficulty: Turning over in bed (including adjusting bedclothes, sheets and blankets)?: None   Patient Difficulty: Sitting down on and standing up from a chair with arms (e.g., wheelchair, bedside commode, etc.): None   Patient Difficulty: Moving from lying on back to sitting on the side of the bed?: None   How much help from another person does the patient currently need. ..    Help from Another: Moving to and from a bed to a chair (including a wheelchair)?: None   Help from Another: Need to walk in hospital room?: None   Help from Another: Climbing 3-5 steps with a railing?: None       AM-PAC Score:  Raw Score: 24   Approx Degree of Impairment: 0%   Standardized Score (AM-PAC Scale): 61.14   CMS Modifier (G-Code): CH    FUNCTIONAL ABILITY STATUS  Gait Assessment   Functional Mobility/Gait Assessment  Gait Assistance: Independent  Distance (ft): 200  Assistive Device: None  Pattern: Within Functional Limits  Stairs: Stairs  How Many Stairs: 11  Device: 1 Rail  Assist: Modified independent  Pattern: Ascend and Descend  Ascend and Descend : Reciprocal    Skilled Therapy Provided Therapeutic Activity:   Educated on dressing with RUE into shirts first or having assist from family for dressing  Educated on usage of LLE for washing hair  Educated on PPM precautions    Bed Mobility:  Rolling: NT  Supine to sit: ind   Sit to supine: NT     Transfer Mobility:  Sit to stand: ind   Stand to sit: ind  Gait = ind    Therapist's comments:RN cleared for session. Pt agreeable for therapy, received supine. Instructed to call for nursing staff for any needs and OOB mobility. Exercise/Education Provided:  Bed mobility  Body mechanics  Energy conservation  Functional activity tolerated  Gait training  Posture  Strengthening  Transfer training    Patient End of Session: Up in chair;Needs met;Call light within reach;RN aware of session/findings; All patient questions and concerns addressed    Patient Evaluation Complexity Level:  History Moderate - 1 or 2 personal factors and/or co-morbidities   Examination of body systems Low - addressing 1-2 elements   Clinical Presentation Low - Stable   Clinical Decision Making Low Complexity       PT Session Time: 15 minutes  Therapeutic Activity: 10 minutes

## 2023-06-30 NOTE — PHYSICAL THERAPY NOTE
Order received, chart reviewed. Requested to hold until echo per cardiology APN. Will hold and follow as appropriate.      Julai Quintana, PT  06/30/23

## 2023-09-28 ENCOUNTER — OFFICE VISIT (OUTPATIENT)
Facility: LOCATION | Age: 68
End: 2023-09-28
Payer: MEDICARE

## 2023-09-28 DIAGNOSIS — Z21 HIV POSITIVE (HCC): ICD-10-CM

## 2023-09-28 DIAGNOSIS — B97.7 HPV IN MALE: ICD-10-CM

## 2023-09-28 DIAGNOSIS — K62.82 DYSPLASIA OF ANUS: Primary | ICD-10-CM

## 2023-09-28 PROCEDURE — 99213 OFFICE O/P EST LOW 20 MIN: CPT | Performed by: STUDENT IN AN ORGANIZED HEALTH CARE EDUCATION/TRAINING PROGRAM
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
## 2024-03-18 ENCOUNTER — OFFICE VISIT (OUTPATIENT)
Facility: LOCATION | Age: 69
End: 2024-03-18
Payer: MEDICARE

## 2024-03-18 VITALS — HEART RATE: 67 BPM | TEMPERATURE: 98 F

## 2024-03-18 DIAGNOSIS — Z87.19 HISTORY OF ANAL DYSPLASIA: Primary | ICD-10-CM

## 2024-03-18 PROCEDURE — 87624 HPV HI-RISK TYP POOLED RSLT: CPT | Performed by: STUDENT IN AN ORGANIZED HEALTH CARE EDUCATION/TRAINING PROGRAM

## 2024-03-18 PROCEDURE — 88112 CYTOPATH CELL ENHANCE TECH: CPT | Performed by: STUDENT IN AN ORGANIZED HEALTH CARE EDUCATION/TRAINING PROGRAM
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
## 2024-03-18 NOTE — PROCEDURES
Procedure: Anoscopy    Surgeon: Aldo Michelle    Anesthesia: None    Findings: See the progress note attached for all findings    Operative Summary: The patient was placed in a prone position on the proctoscopy table, the hips were flexed in the jackknife position.    Using a well-lubricated finger, digital rectal exam was performed in anticipation of the anoscope.    The anoscope was then inserted under direct visualization.    Excellent visualization was performed in a 360° fashion.    The scope was removed. The patient was taken out of the prone, jackknife, position.     The patient tolerated the procedure well.
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
## 2024-03-22 LAB — HPV I/H RISK 1 DNA SPEC QL NAA+PROBE: NEGATIVE

## 2024-06-06 ENCOUNTER — APPOINTMENT (OUTPATIENT)
Dept: GENERAL RADIOLOGY | Facility: HOSPITAL | Age: 69
DRG: 178 | End: 2024-06-06
Payer: MEDICARE

## 2024-06-06 ENCOUNTER — HOSPITAL ENCOUNTER (INPATIENT)
Facility: HOSPITAL | Age: 69
LOS: 2 days | Discharge: HOME | End: 2024-06-08
Attending: EMERGENCY MEDICINE | Admitting: HOSPITALIST
Payer: MEDICARE

## 2024-06-06 ENCOUNTER — HOSPITAL ENCOUNTER (INPATIENT)
Facility: HOSPITAL | Age: 69
LOS: 2 days | Discharge: HOME | DRG: 178 | End: 2024-06-08
Attending: EMERGENCY MEDICINE | Admitting: HOSPITALIST
Payer: MEDICARE

## 2024-06-06 ENCOUNTER — APPOINTMENT (OUTPATIENT)
Dept: CT IMAGING | Facility: HOSPITAL | Age: 69
End: 2024-06-06
Attending: EMERGENCY MEDICINE
Payer: MEDICARE

## 2024-06-06 ENCOUNTER — APPOINTMENT (OUTPATIENT)
Dept: CT IMAGING | Facility: HOSPITAL | Age: 69
DRG: 178 | End: 2024-06-06
Attending: EMERGENCY MEDICINE
Payer: MEDICARE

## 2024-06-06 ENCOUNTER — APPOINTMENT (OUTPATIENT)
Dept: GENERAL RADIOLOGY | Facility: HOSPITAL | Age: 69
End: 2024-06-06
Payer: MEDICARE

## 2024-06-06 DIAGNOSIS — J69.0 ASPIRATION PNEUMONIA OF LEFT LUNG, UNSPECIFIED ASPIRATION PNEUMONIA TYPE, UNSPECIFIED PART OF LUNG (HCC): Primary | ICD-10-CM

## 2024-06-06 DIAGNOSIS — N17.9 ACUTE RENAL INJURY (HCC): ICD-10-CM

## 2024-06-06 DIAGNOSIS — R09.02 HYPOXIA: ICD-10-CM

## 2024-06-06 PROBLEM — R73.9 HYPERGLYCEMIA: Status: ACTIVE | Noted: 2024-06-06

## 2024-06-06 LAB
ALBUMIN SERPL-MCNC: 3.9 G/DL (ref 3.4–5)
ALBUMIN/GLOB SERPL: 0.8 {RATIO} (ref 1–2)
ALP LIVER SERPL-CCNC: 141 U/L
ALT SERPL-CCNC: 49 U/L
ANION GAP SERPL CALC-SCNC: 10 MMOL/L (ref 0–18)
AST SERPL-CCNC: 40 U/L (ref 15–37)
ATRIAL RATE: 82 BPM
BASOPHILS # BLD AUTO: 0.02 X10(3) UL (ref 0–0.2)
BASOPHILS NFR BLD AUTO: 0.3 %
BILIRUB SERPL-MCNC: 0.5 MG/DL (ref 0.1–2)
BUN BLD-MCNC: 36 MG/DL (ref 9–23)
CALCIUM BLD-MCNC: 10.1 MG/DL (ref 8.5–10.1)
CHLORIDE SERPL-SCNC: 98 MMOL/L (ref 98–112)
CO2 SERPL-SCNC: 23 MMOL/L (ref 21–32)
CREAT BLD-MCNC: 1.83 MG/DL
D DIMER PPP FEU-MCNC: 0.94 UG/ML FEU (ref ?–0.7)
EGFRCR SERPLBLD CKD-EPI 2021: 39 ML/MIN/1.73M2 (ref 60–?)
EOSINOPHIL # BLD AUTO: 0.1 X10(3) UL (ref 0–0.7)
EOSINOPHIL NFR BLD AUTO: 1.4 %
ERYTHROCYTE [DISTWIDTH] IN BLOOD BY AUTOMATED COUNT: 12.3 %
GLOBULIN PLAS-MCNC: 5.2 G/DL (ref 2.8–4.4)
GLUCOSE BLD-MCNC: 107 MG/DL (ref 70–99)
HCT VFR BLD AUTO: 41.5 %
HGB BLD-MCNC: 14.4 G/DL
IMM GRANULOCYTES # BLD AUTO: 0.05 X10(3) UL (ref 0–1)
IMM GRANULOCYTES NFR BLD: 0.7 %
LYMPHOCYTES # BLD AUTO: 0.8 X10(3) UL (ref 1–4)
LYMPHOCYTES NFR BLD AUTO: 10.8 %
MCH RBC QN AUTO: 35.3 PG (ref 26–34)
MCHC RBC AUTO-ENTMCNC: 34.7 G/DL (ref 31–37)
MCV RBC AUTO: 101.7 FL
MONOCYTES # BLD AUTO: 0.63 X10(3) UL (ref 0.1–1)
MONOCYTES NFR BLD AUTO: 8.5 %
NEUTROPHILS # BLD AUTO: 5.79 X10 (3) UL (ref 1.5–7.7)
NEUTROPHILS # BLD AUTO: 5.79 X10(3) UL (ref 1.5–7.7)
NEUTROPHILS NFR BLD AUTO: 78.3 %
OSMOLALITY SERPL CALC.SUM OF ELEC: 281 MOSM/KG (ref 275–295)
P AXIS: 19 DEGREES
P-R INTERVAL: 166 MS
PLATELET # BLD AUTO: 184 10(3)UL (ref 150–450)
POTASSIUM SERPL-SCNC: 4.4 MMOL/L (ref 3.5–5.1)
PROT SERPL-MCNC: 9.1 G/DL (ref 6.4–8.2)
Q-T INTERVAL: 366 MS
QRS DURATION: 96 MS
QTC CALCULATION (BEZET): 427 MS
R AXIS: 27 DEGREES
RBC # BLD AUTO: 4.08 X10(6)UL
SODIUM SERPL-SCNC: 131 MMOL/L (ref 136–145)
T AXIS: 28 DEGREES
TROPONIN I SERPL HS-MCNC: 14 NG/L
VENTRICULAR RATE: 82 BPM
WBC # BLD AUTO: 7.4 X10(3) UL (ref 4–11)

## 2024-06-06 PROCEDURE — 71275 CT ANGIOGRAPHY CHEST: CPT | Performed by: EMERGENCY MEDICINE

## 2024-06-06 PROCEDURE — 71045 X-RAY EXAM CHEST 1 VIEW: CPT | Performed by: EMERGENCY MEDICINE

## 2024-06-06 PROCEDURE — 99223 1ST HOSP IP/OBS HIGH 75: CPT | Performed by: HOSPITALIST

## 2024-06-06 RX ORDER — MELATONIN
3 NIGHTLY PRN
Status: DISCONTINUED | OUTPATIENT
Start: 2024-06-06 | End: 2024-06-08

## 2024-06-06 RX ORDER — ACETAMINOPHEN 160 MG/5ML
1000 SOLUTION ORAL ONCE
Status: COMPLETED | OUTPATIENT
Start: 2024-06-06 | End: 2024-06-06

## 2024-06-06 RX ORDER — DEXTROSE MONOHYDRATE AND SODIUM CHLORIDE 5; .9 G/100ML; G/100ML
INJECTION, SOLUTION INTRAVENOUS CONTINUOUS
Status: DISCONTINUED | OUTPATIENT
Start: 2024-06-06 | End: 2024-06-08

## 2024-06-06 RX ORDER — ONDANSETRON 2 MG/ML
4 INJECTION INTRAMUSCULAR; INTRAVENOUS EVERY 4 HOURS PRN
Status: DISCONTINUED | OUTPATIENT
Start: 2024-06-06 | End: 2024-06-06

## 2024-06-06 RX ORDER — ACETAMINOPHEN 500 MG
500 TABLET ORAL EVERY 4 HOURS PRN
Status: DISCONTINUED | OUTPATIENT
Start: 2024-06-06 | End: 2024-06-08

## 2024-06-06 RX ORDER — PANTOPRAZOLE SODIUM 40 MG/1
40 TABLET, DELAYED RELEASE ORAL
Status: DISCONTINUED | OUTPATIENT
Start: 2024-06-07 | End: 2024-06-08

## 2024-06-06 RX ORDER — SENNOSIDES 8.6 MG
17.2 TABLET ORAL NIGHTLY PRN
Status: DISCONTINUED | OUTPATIENT
Start: 2024-06-06 | End: 2024-06-08

## 2024-06-06 RX ORDER — ACYCLOVIR 400 MG/1
800 TABLET ORAL DAILY
Status: DISCONTINUED | OUTPATIENT
Start: 2024-06-06 | End: 2024-06-08

## 2024-06-06 RX ORDER — ALBUTEROL SULFATE 90 UG/1
4 AEROSOL, METERED RESPIRATORY (INHALATION) ONCE
Status: COMPLETED | OUTPATIENT
Start: 2024-06-06 | End: 2024-06-06

## 2024-06-06 RX ORDER — SULFAMETHOXAZOLE AND TRIMETHOPRIM 800; 160 MG/1; MG/1
1 TABLET ORAL ONCE
Status: DISCONTINUED | OUTPATIENT
Start: 2024-06-06 | End: 2024-06-07

## 2024-06-06 RX ORDER — SODIUM CHLORIDE 9 MG/ML
INJECTION, SOLUTION INTRAVENOUS CONTINUOUS
Status: ACTIVE | OUTPATIENT
Start: 2024-06-06 | End: 2024-06-06

## 2024-06-06 RX ORDER — LEVOTHYROXINE SODIUM 88 UG/1
88 TABLET ORAL
Status: DISCONTINUED | OUTPATIENT
Start: 2024-06-06 | End: 2024-06-08

## 2024-06-06 RX ORDER — FLUTICASONE PROPIONATE 50 MCG
1 SPRAY, SUSPENSION (ML) NASAL DAILY
Status: DISCONTINUED | OUTPATIENT
Start: 2024-06-06 | End: 2024-06-08

## 2024-06-06 RX ORDER — ZOLPIDEM TARTRATE 10 MG/1
10 TABLET ORAL NIGHTLY PRN
Status: DISCONTINUED | OUTPATIENT
Start: 2024-06-06 | End: 2024-06-08

## 2024-06-06 RX ORDER — BISACODYL 10 MG
10 SUPPOSITORY, RECTAL RECTAL
Status: DISCONTINUED | OUTPATIENT
Start: 2024-06-06 | End: 2024-06-08

## 2024-06-06 RX ORDER — ONDANSETRON 2 MG/ML
4 INJECTION INTRAMUSCULAR; INTRAVENOUS EVERY 6 HOURS PRN
Status: DISCONTINUED | OUTPATIENT
Start: 2024-06-06 | End: 2024-06-08

## 2024-06-06 RX ORDER — METOCLOPRAMIDE HYDROCHLORIDE 5 MG/ML
5 INJECTION INTRAMUSCULAR; INTRAVENOUS EVERY 8 HOURS PRN
Status: DISCONTINUED | OUTPATIENT
Start: 2024-06-06 | End: 2024-06-08

## 2024-06-06 RX ORDER — HEPARIN SODIUM 5000 [USP'U]/ML
5000 INJECTION, SOLUTION INTRAVENOUS; SUBCUTANEOUS EVERY 12 HOURS SCHEDULED
Status: DISCONTINUED | OUTPATIENT
Start: 2024-06-06 | End: 2024-06-07

## 2024-06-06 RX ORDER — POLYETHYLENE GLYCOL 3350 17 G/17G
17 POWDER, FOR SOLUTION ORAL DAILY PRN
Status: DISCONTINUED | OUTPATIENT
Start: 2024-06-06 | End: 2024-06-08

## 2024-06-06 RX ORDER — ENEMA 19; 7 G/133ML; G/133ML
1 ENEMA RECTAL ONCE AS NEEDED
Status: DISCONTINUED | OUTPATIENT
Start: 2024-06-06 | End: 2024-06-08

## 2024-06-06 RX ORDER — ABACAVIR AND LAMIVUDINE 600; 300 MG/1; MG/1
1 TABLET, FILM COATED ORAL DAILY
Status: DISCONTINUED | OUTPATIENT
Start: 2024-06-06 | End: 2024-06-08

## 2024-06-06 NOTE — ED PROVIDER NOTES
Patient Seen in: [facility] Emergency Department      History     Chief Complaint   Patient presents with    Difficulty Breathing     Stated Complaint: RECENT ASPIRATION LAST WEEK, NOW C/O OF CONTINUED magaly    Subjective:   HPI  This is a 68-year-old male who states he has a PEG tube.  Sometimes he says the material comes up and he aspirates.  He says he might of aspirated on Wednesday night he states he got choked a little bit.  The patient's states that he went to the emergency room.  He was given IV antibiotics and was sent home on oral antibiotics.  The patient still continues to have a little bit of cough and he is coughing up clear sputum.  The patient denies any chest pain she does not he does feel short of breath he is not sure if he is wheezing is never had an inhaler.  The patient does have a history of esophageal reflux he is got a history of hepatitis, is get history of previous history of HIV..  The patient denies any fevers or chills.  He denies any abdominal pain denies any history of congestive heart failure denies any history of DVT denies any calf pain.  He states he is not wheezing at home.    Objective:   No pertinent past medical history.            No pertinent past surgical history.              No pertinent social history.            Review of Systems    Positive for stated complaint: RECENT ASPIRATION LAST WEEK, NOW C/O OF CONTINUED magaly  Other systems are as noted in HPI.  Constitutional and vital signs reviewed.      All other systems reviewed and negative except as noted above.    Physical Exam     ED Triage Vitals [06/06/24 1455]   BP (!) 175/76   Pulse 90   Resp 20   Temp 98.2 °F (36.8 °C)   Temp src Oral   SpO2 98 %   O2 Device None (Room air)       Current Vitals:   Vital Signs  BP: (!) 189/91  Pulse: 96  Resp: 23  Temp: 98.9 °F (37.2 °C)  Temp src: Oral  MAP (mmHg): (!) 120    Oxygen Therapy  SpO2: 93 %  O2 Device: None (Room air)            Physical Exam    General: .  Patient  is a pleasant male who is thin.   The patient is in no respiratory distress. The patient is not septic or toxic    HEENT: Atraumatic, conjunctiva are not pale.  There is no icterus.  Oral mucosa Is wet. The neck is supple. There is no meningismus.    LUNGS: The patient has noted to have some coarse breath sounds bilateral bases.  He is also had some mild end expiratory wheezing.    CV: Cardiovascular is regular without murmurs or rubs.    ABD: The abdomen is soft nondistended nontender.  There is no rebound.  There is no guarding.  Bowel sounds are present.  G-tube in the abdomen.  No tenderness.    EXT: There is good pulses bilaterally.  There is no calf tenderness.  There is no rash noted.  There is no edema    NEURO: He is alert and oriented.  He is moving all extremities.  No focal findings.  ED Course     Labs Reviewed   COMP METABOLIC PANEL (14) - Abnormal; Notable for the following components:       Result Value    Glucose 107 (*)     Sodium 131 (*)     BUN 36 (*)     Creatinine 1.83 (*)     eGFR-Cr 39 (*)     AST 40 (*)     Alkaline Phosphatase 141 (*)     Total Protein 9.1 (*)     Globulin  5.2 (*)     A/G Ratio 0.8 (*)     All other components within normal limits   D-DIMER - Abnormal; Notable for the following components:    D-Dimer 0.94 (*)     All other components within normal limits   CBC W/ DIFFERENTIAL - Abnormal; Notable for the following components:    .7 (*)     MCH 35.3 (*)     Lymphocyte Absolute 0.80 (*)     All other components within normal limits   TROPONIN I HIGH SENSITIVITY - Normal   CBC WITH DIFFERENTIAL WITH PLATELET    Narrative:     The following orders were created for panel order CBC With Differential With Platelet.  Procedure                               Abnormality         Status                     ---------                               -----------         ------                     CBC W/ DIFFERENTIAL[[id number]]          Abnormal            Final result                  Please view results for these tests on the individual orders.   BLOOD CULTURE   BLOOD CULTURE                The patient was placed on monitors, IV was started, blood was drawn.         MDM      The EKG shows normal sinus rhythm.  There is no acute ST elevations or ischemic findings.  The rest of the EKG including rate rhythm axis and intervals I agree with the EKG report . The rate is 82    Admission disposition: 6/6/2024  6:54 PM             The patient's D-dimer was elevated therefore CTA chest was done to rule out PE.  The patient's troponin was negative.  The patient's comprehensive shows BUN of 36 creatinine of 1.83 the patient states he is doing intermittently nauseous also and not been drinking enough fluids.  Because he was coughing and he gets nauseous when he starts coughing.  His last creatinine was somewhere around 1.38 about 11 months ago.  CBC was within normal limits.        I personally reviewed the radiographs and my individual interpretation shows    No obvious pulmonary embolism but findings of some atelectasis, pneumonia on the left lower.    Also reviewed official report and it shows       CT ANGIOGRAPHY, CHEST (CPT=71275)    Result Date: 6/6/2024  PROCEDURE:  CT ANGIOGRAPHY, CHEST (CPT=71275)  COMPARISON:  None.  INDICATIONS:  RECENT ASPIRATION LAST WEEK, NOW C/O OF CONTINUED magaly  TECHNIQUE:  IV contrast-enhanced multislice CT angiography is performed through the pulmonary arterial anatomy. 3D volume renderings are generated.  Dose reduction techniques were used. Dose information is transmitted to the ACR (American College of Radiology) NRDR (National Radiology Data Registry) which includes the Dose Index Registry.  PATIENT STATED HISTORY:(As transcribed by Technologist)  Patient had recent aspiration last week with increased cough and shortness of breath.   CONTRAST USED:  100cc of Isovue 370  FINDINGS:  VASCULATURE:  No visible pulmonary arterial thrombus or attenuation.  THORACIC AORTA:   Moderate mixed atherosclerosis of the aortic arch and descending segment without aneurysm, dissection, or flow-limiting stenosis. LUNGS:  Wall thickening and endobronchial debris identified within the lower lungs, most significantly involving left lower lobe.  There are extensive tree-in-bud type opacities within the basal segments of the left lower lobe, present to a lesser degree  within the basal segments of the right lower lobe and caudal aspect of the middle lobe and lingula.  Findings are consistent with aspiration pneumonia.  MEDIASTINUM:  Enlarged subcarinal lymph node measures 1.2 cm. RON:  Mildly enlarged bilateral hilar lymph nodes. CARDIAC:  No enlargement, pericardial thickening, or significant coronary artery calcification. PLEURA:  No mass or effusion.  CHEST WALL:  Right chest wall ICD.  Left chest port. LIMITED ABDOMEN:  No acute abnormalities of the visualized superior abdomen. BONES:  Multilevel degenerative changes of the spine. OTHER:  Negative.             CONCLUSION:   1. Negative for pulmonary embolism.  2. Findings of aspiration pneumonia within the lower lungs, most significantly involving the basal segments of the left lower lobe.   3. Reactive hilar and mediastinal lymphadenopathy.     LOCATION:  [facility]   Dictated by (CST): Kenton Davalos MD on 6/06/2024 at 6:34 PM     Finalized by (CST): Kenton Davalos MD on 6/06/2024 at 6:40 PM       XR CHEST AP PORTABLE  (CPT=71045)    Result Date: 6/6/2024  PROCEDURE:  XR CHEST AP PORTABLE  (CPT=71045)  TECHNIQUE:  AP chest radiograph was obtained.  COMPARISON:  [facility] , XR, XR CHEST PA + LAT CHEST (VIM=[id number]), 6/30/2023, 9:21 AM.  INDICATIONS:  RECENT ASPIRATION LAST WEEK, NOW C/O OF CONTINUED magaly  PATIENT STATED HISTORY: (As transcribed by Technologist)  Patient is experiencing cough, congestion, and diffculty breathing.    FINDINGS:  The patient is markedly rotated to the left.  Mild patchy opacities are noted at the left lung base.  CP angles are  sharp.  Heart and pulmonary vessels appear stable allowing for obliquity.  Pacer system is again seen from the right.  Left chest port is again seen.  No pneumothorax.             CONCLUSION:  New mild opacities at the left lung base.  This may reflect any combination of atelectasis and or pneumonitis/pneumonia.  Continued clinical correlation and follow-up recommended.   LOCATION:  [facility]      Dictated by (CST): Francisco Concepcion MD on 6/06/2024 at 4:30 PM     Finalized by (CST): Francisco Concepcion MD on 6/06/2024 at 4:31 PM           The patient intermittently will get hypoxic here and sometimes dropped to 88 to 90%.  He does seem to come up with coughing.  The fact that the patient has had been on doxycycline and is continued to be wheezing, short of breath vomiting and feeling short of breath the patient will need to be admitted for IV antibiotic discussed this case with Dr. Love.  The patient will be admitted for and was given IV antibiotics..       Medical Decision Making      Disposition and Plan     Clinical Impression:  1. Aspiration pneumonia of left lung, unspecified aspiration pneumonia type, unspecified part of lung (HCC)    2. Acute renal injury (HCC)    3. Hypoxia         Disposition:  Admit  6/6/2024  6:54 pm    Follow-up:  No follow-up provider specified.        Medications Prescribed:  Current Discharge Medication List                            Hospital Problems       Present on Admission  Date Reviewed: 3/18/2024            ICD-10-CM Noted POA    * (Principal) Aspiration pneumonia of left lung, unspecified aspiration pneumonia type, unspecified part of lung (HCC) J69.0 6/6/2024 Unknown    Hyperglycemia R73.9 6/6/2024 Yes

## 2024-06-06 NOTE — ED INITIAL ASSESSMENT (HPI)
Pt being treated with abx for aspiration pneumonia, pt reports symptoms are not improving, c/o cough, congestion, ISABELLA, denies CP

## 2024-06-07 LAB
ANION GAP SERPL CALC-SCNC: 3 MMOL/L (ref 0–18)
BASOPHILS # BLD AUTO: 0.02 X10(3) UL (ref 0–0.2)
BASOPHILS NFR BLD AUTO: 0.5 %
BUN BLD-MCNC: 26 MG/DL (ref 9–23)
CALCIUM BLD-MCNC: 8.9 MG/DL (ref 8.5–10.1)
CHLORIDE SERPL-SCNC: 108 MMOL/L (ref 98–112)
CO2 SERPL-SCNC: 27 MMOL/L (ref 21–32)
CREAT BLD-MCNC: 1.42 MG/DL
EGFRCR SERPLBLD CKD-EPI 2021: 53 ML/MIN/1.73M2 (ref 60–?)
EOSINOPHIL # BLD AUTO: 0.11 X10(3) UL (ref 0–0.7)
EOSINOPHIL NFR BLD AUTO: 2.7 %
ERYTHROCYTE [DISTWIDTH] IN BLOOD BY AUTOMATED COUNT: 12.6 %
GLUCOSE BLD-MCNC: 108 MG/DL (ref 70–99)
GLUCOSE BLD-MCNC: 118 MG/DL (ref 70–99)
HCT VFR BLD AUTO: 35.2 %
HGB BLD-MCNC: 11.6 G/DL
IMM GRANULOCYTES # BLD AUTO: 0.03 X10(3) UL (ref 0–1)
IMM GRANULOCYTES NFR BLD: 0.7 %
LYMPHOCYTES # BLD AUTO: 0.58 X10(3) UL (ref 1–4)
LYMPHOCYTES NFR BLD AUTO: 14.3 %
MCH RBC QN AUTO: 34.7 PG (ref 26–34)
MCHC RBC AUTO-ENTMCNC: 33 G/DL (ref 31–37)
MCV RBC AUTO: 105.4 FL
MONOCYTES # BLD AUTO: 0.53 X10(3) UL (ref 0.1–1)
MONOCYTES NFR BLD AUTO: 13 %
NEUTROPHILS # BLD AUTO: 2.8 X10 (3) UL (ref 1.5–7.7)
NEUTROPHILS # BLD AUTO: 2.8 X10(3) UL (ref 1.5–7.7)
NEUTROPHILS NFR BLD AUTO: 68.8 %
OSMOLALITY SERPL CALC.SUM OF ELEC: 291 MOSM/KG (ref 275–295)
PLATELET # BLD AUTO: 164 10(3)UL (ref 150–450)
POTASSIUM SERPL-SCNC: 4.6 MMOL/L (ref 3.5–5.1)
RBC # BLD AUTO: 3.34 X10(6)UL
SODIUM SERPL-SCNC: 138 MMOL/L (ref 136–145)
WBC # BLD AUTO: 4.1 X10(3) UL (ref 4–11)

## 2024-06-07 PROCEDURE — 99232 SBSQ HOSP IP/OBS MODERATE 35: CPT | Performed by: INTERNAL MEDICINE

## 2024-06-07 RX ORDER — SODIUM BICARBONATE 325 MG/1
325 TABLET ORAL AS NEEDED
Status: DISCONTINUED | OUTPATIENT
Start: 2024-06-07 | End: 2024-06-08

## 2024-06-07 RX ORDER — HEPARIN SODIUM 5000 [USP'U]/ML
5000 INJECTION, SOLUTION INTRAVENOUS; SUBCUTANEOUS EVERY 12 HOURS SCHEDULED
Status: DISCONTINUED | OUTPATIENT
Start: 2024-06-07 | End: 2024-06-08

## 2024-06-07 RX ORDER — SULFAMETHOXAZOLE AND TRIMETHOPRIM 800; 160 MG/1; MG/1
1 TABLET ORAL
Status: DISCONTINUED | OUTPATIENT
Start: 2024-06-07 | End: 2024-06-08

## 2024-06-07 RX ORDER — FAMOTIDINE 20 MG/1
40 TABLET, FILM COATED ORAL DAILY
Status: DISCONTINUED | OUTPATIENT
Start: 2024-06-07 | End: 2024-06-08

## 2024-06-07 NOTE — ED QUICK NOTES
Rounding Completed    Plan of Care reviewed. Waiting for inpatient room.  Elimination needs assessed.    Bed is locked and in lowest position. Call light within reach.

## 2024-06-07 NOTE — ED QUICK NOTES
Orders for admission, patient is aware of plan and ready to go upstairs. Any questions, please call ED RN Martina at extension [phone].     Patient Covid vaccination status: Fully vaccinated     COVID Test Ordered in ED: None    COVID Suspicion at Admission: N/A    Running Infusions:  0.9 NaCl infused at 125ml/hr and zosyn 4.5G at 200ml/hr     Mental Status/LOC at time of transport: a/ox4    Other pertinent information: none  CIWA score: N/A   NIH score:  N/A

## 2024-06-07 NOTE — PLAN OF CARE
Pt is A&Ox4. Wears glasses, [place]- b/l HA. VSS, afebrile. SPO2 maintained on RA. Possible 2L at NOC. Strong/productive cough. Tele, A-V paced. Heparin. Last BM 6/6. PEG tube. Voids. Up ad yousif. Denies pain. PIV, IVF. No further needs at this time, continue POC. Safety precautions in place.     Problem: Patient/Family Goals  Goal: Patient/Family Long Term Goal  Description: Patient's Long Term Goal: discharge back home    Interventions:  - cluster care  - See additional Care Plan goals for specific interventions  Outcome: Progressing  Goal: Patient/Family Short Term Goal  Description: Patient's Short Term Goal:   6/6 NOC: sleep    Interventions:   - cluster care  - See additional Care Plan goals for specific interventions  Outcome: Progressing

## 2024-06-07 NOTE — PROGRESS NOTES
[facility]   part of [facility]     Hospitalist Progress Note     Raleigh Bah Patient Status:  Inpatient    1955 MRN [id number]   Location [facility] 5NW-A Attending Stacy Coronado, DO   Hosp Day # 1 PCP CORTEZ BYRD     Chief Complaint: SOB    Subjective:     Patient continues to have productive sounding cough, but reports feeling much better. SOB improved. Hungry and waiting on TF to be resumed    Objective:    Review of Systems:   A comprehensive review of systems was completed; pertinent positive and negatives stated in subjective.    Vital signs:  Temp:  [97.8 °F (36.6 °C)-98.9 °F (37.2 °C)] 97.9 °F (36.6 °C)  Pulse:  [70-96] 78  Resp:  [14-23] 20  BP: ()/(42-96) 107/62  SpO2:  [92 %-98 %] 92 %    Physical Exam:    General: No acute distress  Respiratory: No wheezes, no rhonchi, coarse throughout   Cardiovascular: S1, S2, regular rate and rhythm  Abdomen: Soft, Non-tender, non-distended, positive bowel sounds, +G-tube  Neuro: No new focal deficits.   Extremities: No edema    Diagnostic Data:    Labs:  Recent Labs   Lab 24  1518   WBC 7.4   HGB 14.4   .7*   .0       Recent Labs   Lab 24  1518 24  0710   * 108*   BUN 36* 26*   CREATSERUM 1.83* 1.42*   CA 10.1 8.9   ALB 3.9  --    * 138   K 4.4 4.6   CL 98 108   CO2 23.0 27.0   ALKPHO 141*  --    AST 40*  --    ALT 49  --    BILT 0.5  --    TP 9.1*  --        Estimated Creatinine Clearance: 50.6 mL/min (A) (based on SCr of 1.42 mg/dL (H)).    Recent Labs   Lab 24  1518   TROPHS 14       No results for input(s): \"PTP\", \"INR\" in the last 168 hours.               Microbiology    No results found for this visit on 24.      Imaging: Reviewed in Epic.    Medications:    acyclovir  800 mg Oral Daily    Abacavir Sulfate-lamiVUDine  1 tablet Oral Daily    sulfamethoxazole-trimethoprim DS  1 tablet Oral Once    dolutegravir  50 mg Oral Daily    Darunavir-Cobicistat  1 tablet  Oral Daily    levothyroxine  88 mcg Oral Before breakfast    fluticasone propionate  1 spray Each Nare Daily    pantoprazole  40 mg Oral QAM AC    heparin  5,000 Units Subcutaneous 2 times per day    piperacillin-tazobactam  3.375 g Intravenous Q8H       Assessment & Plan:      #Aspiration Pneumonia  - empiric zosyn  - follow Cx results     #FATUMA  -IVF  - trend Cr and lytes     #Hypoxia; due to aspiration PNA  - resolved     #HIV  - controlled  - PTA antivirals and Bactrim prophylaxis     #A.fib with tachybrady syndrome  -s/p dual chamber pacemaker     #Tonsillary, anal cancer with history of chemoradiation  #Dysphagia due to above status post PEG tube  - resume TF  - nutrition following     #Hypothyroidism  - levothyroxine      Stacy Coronado, DO    Supplementary Documentation:     Quality:  DVT Mechanical Prophylaxis:   SCDs,    DVT Pharmacologic Prophylaxis   Medication    heparin (Porcine) 5000 UNIT/ML injection 5,000 Units                Code Status: Prior  Minor: No urinary catheter in place  Minor Duration (in days):   Central line:    WILLY:     Discharge is dependent on: clinical state, culture results   At this point Mr. Bah is expected to be discharge to: home     The 21st Century Cures Act makes medical notes like these available to patients in the interest of transparency. Please be advised this is a medical document. Medical documents are intended to carry relevant information, facts as evident, and the clinical opinion of the practitioner. The medical note is intended as peer to peer communication and may appear blunt or direct. It is written in medical language and may contain abbreviations or verbiage that are unfamiliar.

## 2024-06-07 NOTE — PLAN OF CARE
Assumed care at 1030  A/Ox4  Rm Air  AV Paced on tele  Strong productive cough  Cont bowel and bladder, BRP  Denies pain  Up ad yousif  Peg Tube LLQ, Bolus feeds  IV ABX and fluids per order  Safety precautions in place  Call light in reach of patient.

## 2024-06-07 NOTE — PROGRESS NOTES
NURSING ADMISSION NOTE      Patient admitted via Cart  Oriented to room.  Safety precautions initiated.  Bed in low position.  Call light in reach.  Admitted to room 532    Pt arrived alert and oriented x4, on RA. Admission navigator, med rec, and flowsheets complete. Educated pt on POC, verbalizes understanding, no further questions.
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
## 2024-06-08 VITALS
OXYGEN SATURATION: 97 % | BODY MASS INDEX: 22.08 KG/M2 | WEIGHT: 163 LBS | HEART RATE: 69 BPM | HEIGHT: 72 IN | TEMPERATURE: 98 F | RESPIRATION RATE: 16 BRPM | DIASTOLIC BLOOD PRESSURE: 62 MMHG | SYSTOLIC BLOOD PRESSURE: 115 MMHG

## 2024-06-08 LAB — GLUCOSE BLD-MCNC: 98 MG/DL (ref 70–99)

## 2024-06-08 PROCEDURE — 99239 HOSP IP/OBS DSCHRG MGMT >30: CPT | Performed by: INTERNAL MEDICINE

## 2024-06-08 RX ORDER — AMOXICILLIN AND CLAVULANATE POTASSIUM 875; 125 MG/1; MG/1
1 TABLET, FILM COATED ORAL 2 TIMES DAILY
Qty: 12 TABLET | Refills: 0 | Status: SHIPPED | OUTPATIENT
Start: 2024-06-08 | End: 2024-06-14

## 2024-06-08 NOTE — DISCHARGE SUMMARY
[facility]     Raleigh Bah Patient Status:  Inpatient    1955 MRN [id number]   Location [facility] 5NW-A Attending Stacy Coronado, DO   Hosp Day # 2 PCP CORTEZ BYRD     Date of Admission: 2024  Date of Discharge:   ***    Discharge Disposition: Home or Self Care    Discharge Diagnosis:  ***    History of Present Illness: ***    Brief Synopsis: ***    Lace+ Score: 69  59-90 High Risk  29-58 Medium Risk  0-28   Low Risk       TCM Follow-Up Recommendation:  {Care Managers will evaluate the need for follow-up for all patients ages 50+, and high/moderate risk patients ages 25-49. Low risk patients (LACE < 29) will only be evaluated if the \"Still recommend for TCM follow-up\" option is selected from this list.:[id number]}      Procedures during hospitalization:   ***    Incidental or significant findings and recommendations (brief descriptions):  ***    Lab/Test results pending at Discharge:   ***    Consultants:  ***    Discharge Medication List:     Discharge Medications        START taking these medications        Instructions Prescription details   amoxicillin clavulanate 875-125 MG Tabs  Commonly known as: Augmentin      Take 1 tablet by mouth 2 (two) times daily for 6 days.   Stop taking on: 2024  Quantity: 12 tablet  Refills: 0            CONTINUE taking these medications        Instructions Prescription details   Abacavir Sulfate-lamiVUDine 600-300 MG Tabs  Commonly known as: EPZICOM      Take 1 tablet by mouth daily.   Refills: 0     BD Syringe 60 ML Misc  Generic drug: Syringe (Disposable)      1 Syringe by Tube route daily.   Refills: 0     Darunavir-Cobicistat 800-150 MG Tabs  Commonly known as: Prezcobix      Take 1 tablet by mouth daily.   Refills: 0     dolutegravir 50 MG Tabs  Commonly known as: Tivicay      Take 1 tablet (50 mg total) by mouth daily.   Refills: 0     Esomeprazole Magnesium 40 MG Cpdr  Commonly known as: NEXIUM      Take 1  capsule (40 mg total) by mouth daily.   Refills: 0     famotidine 40 MG Tabs  Commonly known as: Pepcid      Take 1 tablet (40 mg total) by mouth daily.   Refills: 0     levothyroxine 88 MCG Tabs  Commonly known as: Synthroid      Take 1 tablet (88 mcg total) by mouth before breakfast.   Refills: 0     sulfamethoxazole-trimethoprim -160 MG Tabs per tablet  Commonly known as: Bactrim DS      Take 1 tablet by mouth once. 3x/week: MWF   Refills: 0     zolpidem 10 MG Tabs  Commonly known as: Ambien      Take 1 tablet (10 mg total) by mouth nightly as needed.   Refills: 0            ASK your doctor about these medications        Instructions Prescription details   acyclovir 800 MG Tabs  Commonly known as: Zovirax      Take 1 tablet (800 mg total) by mouth daily.   Refills: 0     fluticasone propionate 50 MCG/ACT Susp  Commonly known as: Flonase      1 spray by Each Nare route daily.   Refills: 0               Where to Get Your Medications        These medications were sent to [facility] [address] [phone], [phone]  [address] [address]      Phone: [phone]   amoxicillin clavulanate 875-125 MG Tabs         [facility] reviewed: ***    Follow-up appointment:   No follow-up provider specified.  Appointments for Next 30 Days 6/8/2024 - 7/8/2024      None            Vital signs:  Temp:  [97.9 °F (36.6 °C)-99 °F (37.2 °C)] 98.5 °F (36.9 °C)  Pulse:  [60-72] 60  Resp:  [16-20] 16  BP: (107-156)/(52-79) 118/52  SpO2:  [90 %-94 %] 92 %    Physical Exam:    General: No acute distress   Lungs: clear to auscultation  Cardiovascular: S1, S2  Abdomen: Soft  ***    -----------------------------------------------------------------------------------------------  PATIENT DISCHARGE INSTRUCTIONS: See electronic chart    Stacy Coronado,     Total time spent on discharge planning:  *** minutes     The 21st Century Cures Act makes medical notes like these available to  patients in the interest of transparency. Please be advised this is a medical document. Medical documents are intended to carry relevant information, facts as evident, and the clinical opinion of the practitioner. The medical note is intended as peer to peer communication and may appear blunt or direct. It is written in medical language and may contain abbreviations or verbiage that are unfamiliar.

## 2024-06-08 NOTE — PROGRESS NOTES
NURSING DISCHARGE NOTE    Discharged Home via  self driving .  Accompanied by  self  Belongings Taken by patient/family Medication returned from Pharmacy. All questions answerd at bedside. Patient verbalized understanding.

## 2024-06-08 NOTE — PROGRESS NOTES
Patient declining all morning pills via g-tube at this time until his tube feedings are resumed and given. Dr. Coronado aware, patient cleared to resume tube feedings. Discussed with dietary team.

## 2024-06-10 NOTE — CDS QUERY
CLINICAL DOCUMENTATION CLARIFICATION FORM  Dear Dr. Coronado:  Can you please clarify the patient's HIV status?  [  ] HIV positive/asymptomatic HIV infection status   [  ] HIV Positive with current or previous HIV-related condition(please specify condition)______________________  [  ] Other (please specify): _________________________      Documentation from the medical record:  Risk factors/Clinical indicators:   ___HX HIV, carcinoma anal/ rectal , HPV.   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 Avoidance / minimization of antibiotics would increase chance of eliminating it from colonizing your mucosal surfaces.   ___Home meds; dolutegravir, Darunavir-Cobicistat, acyclovir, Abacavir-sulfate-lamivudine  Treatment; Home meds            If you have any questions, please contact Clinical : Shea Lindsey RN [phone] [email or web address]  Thank You  THIS FORM IS A PERMANENT PART OF THE MEDICAL RECORD

## 2024-11-04 ENCOUNTER — OFFICE VISIT (OUTPATIENT)
Facility: LOCATION | Age: 69
End: 2024-11-04
Payer: MEDICARE

## 2024-11-04 VITALS
BODY MASS INDEX: 23.7 KG/M2 | SYSTOLIC BLOOD PRESSURE: 135 MMHG | DIASTOLIC BLOOD PRESSURE: 77 MMHG | OXYGEN SATURATION: 98 % | TEMPERATURE: 98 F | WEIGHT: 175 LBS | HEART RATE: 81 BPM | HEIGHT: 72 IN

## 2024-11-04 DIAGNOSIS — Z21 HIV POSITIVE (HCC): ICD-10-CM

## 2024-11-04 DIAGNOSIS — B97.7 HPV IN MALE: ICD-10-CM

## 2024-11-04 DIAGNOSIS — Z87.19 HISTORY OF ANAL DYSPLASIA: Primary | ICD-10-CM

## 2024-11-04 RX ORDER — ROSUVASTATIN CALCIUM 5 MG/1
TABLET, COATED ORAL
COMMUNITY

## 2024-11-04 RX ORDER — AVOBENZONE, HOMOSALATE, OCTISALATE, OCTOCRYLENE, OXYBENZONE 30; 130; 50; 70; 40 MG/ML; MG/ML; MG/ML; MG/ML; MG/ML
1 LOTION TOPICAL DAILY
COMMUNITY
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
## 2025-08-04 ENCOUNTER — OFFICE VISIT (OUTPATIENT)
Facility: LOCATION | Age: 70
End: 2025-08-04

## 2025-08-04 VITALS
DIASTOLIC BLOOD PRESSURE: 74 MMHG | HEART RATE: 62 BPM | SYSTOLIC BLOOD PRESSURE: 118 MMHG | OXYGEN SATURATION: 97 % | TEMPERATURE: 99 F

## 2025-08-04 DIAGNOSIS — R13.10 DYSPHAGIA, UNSPECIFIED TYPE: Primary | ICD-10-CM

## 2025-08-04 DIAGNOSIS — Z87.19 HISTORY OF ANAL DYSPLASIA: ICD-10-CM

## 2025-08-04 DIAGNOSIS — Z21 HIV POSITIVE (HCC): ICD-10-CM

## 2025-08-04 DIAGNOSIS — B97.7 HPV IN MALE: ICD-10-CM

## (undated) DEVICE — LAP CHOLE/APPY CDS-LF: Brand: MEDLINE INDUSTRIES, INC.

## (undated) DEVICE — SUPER ATRAUMATIC MODIFIED GRASPER TIP, DISPOSABLE: Brand: RENEW

## (undated) DEVICE — TRAY SURESTEP 16 BARDEX UMETR

## (undated) DEVICE — TROCAR: Brand: KII® SLEEVE

## (undated) DEVICE — BETADINE SOLUTION 8 OZ BTL

## (undated) DEVICE — ENDOPATH ULTRA VERESS INSUFFLATION NEEDLES WITH LUER LOCK CONNECTORS: Brand: ENDOPATH

## (undated) DEVICE — SOL  .9 1000ML BTL

## (undated) DEVICE — #15 STERILE STAINLESS BLADE: Brand: STERILE STAINLESS BLADES

## (undated) DEVICE — POUCH SPECIMEN WIRE 6X3 250ML

## (undated) DEVICE — TROCAR: Brand: KII SHIELDED BLADED ACCESS SYSTEM

## (undated) DEVICE — THE ECHELON, ECHELON ENDOPATH™ AND ECHELON FLEX™ FAMILIES OF ENDOSCOPIC LINEAR CUTTERS AND RELOADS ARE STERILE, SINGLE PATIENT USE INSTRUMENTS THAT SIMULTANEOUSLY CUT AND STAPLE TISSUE. THERE ARE SIX STAGGERED ROWS OF STAPLES, THREE ON EITHER SIDE OF THE CUT LINE. THE 45 MM INSTRUMENTS HAVE A STAPLE LINE THATIS APPROXIMATELY 45 MM LONG AND A CUT LINE THAT IS APPROXIMATELY 42 MM LONG. THE SHAFT CAN ROTATE FREELY IN BOTH DIRECTIONS AND AN ARTICULATION MECHANISM ON ARTICULATING INSTRUMENTS ENABLES BENDING THE DISTAL PORTIONOF THE SHAFT TO FACILITATE LATERAL ACCESS OF THE OPERATIVE SITE.THE INSTRUMENTS ARE SHIPPED WITHOUT A RELOAD AND MUST BE LOADED PRIOR TO USE. A STAPLE RETAINING CAP ON THE RELOAD PROTECTS THE STAPLE LEG POINTS DURING SHIPPING AND TRANSPORTATION. THE INSTRUMENTS’ LOCK-OUT FEATURE IS DESIGNED TO PREVENT A USED RELOAD FROM BEING REFIRED.: Brand: ECHELON ENDOPATH

## (undated) DEVICE — STERILE POLYISOPRENE POWDER-FREE SURGICAL GLOVES: Brand: PROTEXIS

## (undated) DEVICE — SUTURE VICRYL 2-0

## (undated) DEVICE — DRESSING 4X8 STERILE

## (undated) DEVICE — KENDALL SCD EXPRESS SLEEVES, KNEE LENGTH, MEDIUM: Brand: KENDALL SCD

## (undated) DEVICE — PENCIL TELESCOPE MEGADYNE SE

## (undated) DEVICE — MEGADYNE ELECTRODE ADULT PT RT

## (undated) DEVICE — 40580 - THE PINK PAD - ADVANCED TRENDELENBURG POSITIONING KIT: Brand: 40580 - THE PINK PAD - ADVANCED TRENDELENBURG POSITIONING KIT

## (undated) DEVICE — LIGHT HANDLE

## (undated) DEVICE — PREMIUM WET SKIN PREP TRAY: Brand: MEDLINE INDUSTRIES, INC.

## (undated) DEVICE — ABDOMINAL PAD: Brand: DERMACEA

## (undated) DEVICE — TROCAR: Brand: KII FIOS FIRST ENTRY

## (undated) DEVICE — STERILE POLYISOPRENE POWDER-FREE SURGICAL GLOVES WITH EMOLLIENT COATING: Brand: PROTEXIS

## (undated) DEVICE — LAPAROVUE VISIBILITY SYSTEM LAPAROSCOPIC SOLUTIONS: Brand: LAPAROVUE

## (undated) DEVICE — DERMABOND CLOSURE 0.7ML TOPICL

## (undated) DEVICE — SUT MONOCRYL 4-0 PS-2 Y496G

## (undated) DEVICE — HUNTER GASPER TIP, DISPOSABLE: Brand: RENEW

## (undated) DEVICE — SOL NACL IRRIG 0.9% 1000ML BTL

## (undated) DEVICE — APPLICATOR CHLORAPREP 26ML

## (undated) DEVICE — GOWN,SIRUS,FABRIC-REINFORCED,X-LARGE: Brand: MEDLINE

## (undated) DEVICE — ECHELON 3000 45 STANDARD: Brand: ECHELON

## (undated) DEVICE — SUT VICRYL 0 UR-6 J603H

## (undated) DEVICE — GRABBER GRASPER TIP, DISPOSABLE: Brand: RENEW

## (undated) DEVICE — 3M(TM) MICROPORE TAPE DISPENSER 1535-2: Brand: 3M™ MICROPORE™

## (undated) DEVICE — SPECIMEN CONTAINER,POSITIVE SEAL INDICATOR, OR PACKAGED: Brand: PRECISION

## (undated) DEVICE — RECTAL CDS-LF: Brand: MEDLINE INDUSTRIES, INC.

## (undated) DEVICE — SLEEVE KENDALL SCD EXPRESS MED

## (undated) NOTE — LETTER
18    Patient: Lennox Book  : 1955 Visit date: 3/20/2018    Dear  Dr. Izabella Rizo MD,    Thank you for referring Lennox Book to my practice. Please find my assessment and plan below.                Assessment   Carcinoma in situ of anal disease. There are no fissures or fistulae. Anal tone is weak at 1/4 basal and 2/4 maximum squeeze pressure. Prostate is normal.    There appears to be no evidence of recurrent or residual squamous cell type lesions of either condyloma, or AIN 3.     We

## (undated) NOTE — MR AVS SNAPSHOT
[address] [facility] [address]  [address] [id number] [phone]               Thank you for choosing us for your health care visit with Bob Pearce MD.  We are glad to serve you and happy to provide you wi constipation, fevers, or chills. He denies any noticeable skin changes near the anus. He denies any black or tarry stools, bright red blood per rectum, or blood within the stool. The patient's last colonoscopy was in 2012 prior to his surgery.     The pa patient were answered in detail. A description of the procedure and it's possible outcomes was fully discussed. The patient seemed to understand the conversation and its details. Consent for surgery was confirmed with the patient.     The patient's hardin visit,  view other health information, and more. To sign up or find more information, go to [email or web address] and click on the Sign Up Now link in the [employer] box.      Enter your [employer] Activation Code exactly as it appears below along with yo

## (undated) NOTE — LETTER
22    Patient: Lamar Mock  : 1955 Visit date: 2022    Dear  Adia Michel MD    Thank you for referring Lamar Mock to my practice. Please find my assessment and plan below. 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  The patient's initial operation was performed at an outside institution. He presented to [facility] for wide local excision where the residual tumor was resected on 2012. Pathology did demonstrate AIN 3 present. There was dysplastic epithelium extending to both mucosal margins. The deep margin was free of dysplasia. There was no evidence of invasive cancer. 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 On his last biopsies performed on 2021, one of the anal lesions also was positive for herpes viral cytopathic changes. The patient denies any symptoms such as nausea, vomiting, diarrhea, constipation, fevers, or chills. He denies any noticeable skin changes near the anus. He denies any black or tarry stools, bright red blood per rectum, or blood within the stool. The patient's last colonoscopy was in  prior to his surgery. 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 The patient has recently followed up with his infectious disease doctor. Both of these issues are stable. The patient does have a PEG feeding tube in place. He is tolerating tube feeds with very mild chronic reflux. His weight and energy level remains stable. Clinical exam including anoscopy reveals him to have an anal margin free of any evidence of new or suspicious lesions. There are no ulcerations. There is no pruritus. There are no signs or evidence of HPV or AIN disease. There are no fissures or fistulae. Prostate is normal.  Anal tone is 1/4 basal, 1/4 maximum squeeze pressure. There is no evidence of proctitis or Crohn's disease. There are no HPV or AIN lesions on the anal canal or anal mucosal surfaces. This patient remains free of any evidence of perianal disease. I will see him again in January 2023. He should present to me urgently for any findings on self-examination even if there prior to the next scheduled visits.          Sincerely,       Lakshmi Ramos MD   CC: No Recipients

## (undated) NOTE — LETTER
10/12/21    Patient: Dodie Eldridge  : 1955 Visit date: 10/12/2021    Dear  Reyna Valencia MD    Thank you for referring Dodie Eldridge to my practice. Please find my assessment and plan below.     Assessment   Carcinoma in situ of anal canal  (p He states his CD4 count has been stable around 170 for the last 10 years.     Clinical examination of the rectum including anoscopy reveals there to be no external condyloma, hemorrhoids, fissures, fistula, or abscesses.  Digital rectal exam reveals the pro

## (undated) NOTE — LETTER
19    Patient: Jonathan Bell  : 1955 Visit date: 9/3/2019    Dear  Dr. Darlin Sharp MD,    Thank you for referring Jonathan Bell to my practice. Please find my assessment and plan below.         Assessment   Carcinoma in situ of anal canal diagnosed as septic. A source of infection was not determined. The patient was treated with 24 hours of IV antibiotics and discharged home on oral antibiotics. He has followed up with his primary care physician. He has had no return of his fevers.     C

## (undated) NOTE — LETTER
[facility]  Phone: [phone]  Right Fax: [phone] [address] By:  Mandy De RN Date: 4/12/17    Patient Name: Melba Arias  Surgery Date: 4/14/2017    CSN: [id number]  Medical Record: [id number]

## (undated) NOTE — LETTER
[facility] [address], [address] [place] [address]  Consent for Procedure/Sedation  Date: 6/29/2023         Time: 1500    I hereby authorize Dr. Jina Pacheco, my physician and his/her assistants (if applicable), which may include medical students, residents, and/or fellows, to perform the following surgical operation/ procedure and administer such anesthesia as may be determined necessary by my physician: [facility] Permanent Pacemaker on [address] [facility]  2. I recognize that during the surgical operation/procedure, unforeseen conditions may necessitate additional or different procedures than those listed above. I, therefore, further authorize and request that the above-named surgeon, assistants, or designees perform such procedures as are, in their judgment, necessary and desirable. 3.   My surgeon/physician has discussed prior to my surgery the potential benefits, risks and side effects of this procedure; the likelihood of achieving goals; and potential problems that might occur during recuperation. They also discussed reasonable alternatives to the procedure, including risks, benefits, and side effects related to the alternatives and risks related to not receiving this procedure. I have had all my questions answered and I acknowledge that no guarantee has been made as to the result that may be obtained. 4.   Should the need arise during my operation/procedure, which includes change of level of care prior to discharge, I also consent to the administration of blood and/or blood products. Further, I understand that despite careful testing and screening of blood or blood products by collecting agencies, I may still be subject to ill effects as a result of receiving a blood transfusion and/or blood products.   The following are some, but not all, of the potential risks that can occur: fever and allergic reactions, hemolytic reactions, transmission of diseases such as Hepatitis, AIDS and Cytomegalovirus (CMV) and fluid overload. In the event that I wish to have an autologous transfusion of my own blood, or a directed donor transfusion, I will discuss this with my physician. Check only if Refusing Blood or Blood Products  I understand refusal of blood or blood products as deemed necessary by my physician may have serious consequences to my condition to include possible death. I hereby assume responsibility for my refusal and release the hospital, its personnel, and my physicians from any responsibility for the consequences of my refusal.         o  Refuse         5. I authorize the use of any specimen, organs, tissues, body parts or foreign objects that may be removed from my body during the operation/procedure for diagnosis, research or teaching purposes and their subsequent disposal by hospital authorities. I also authorize the release of specimen test results and/or written reports to my treating physician on the hospital medical staff or other referring or consulting physicians involved in my care, at the discretion of the Pathologist or my treating physician. 6.   I consent to the photographing or videotaping of the operations or procedures to be performed, including appropriate portions of my body for medical, scientific, or educational purposes, provided my identity is not revealed by the pictures or by descriptive texts accompanying them. If the procedure has been photographed/videotaped, the surgeon will obtain the original picture, image, videotape or CD. The hospital will not be responsible for storage, release or maintenance of the picture, image, tape or CD.    7.   I consent to the presence of a  or observers in the operating room as deemed necessary by my physician or their designees. 8.   I recognize that in the event my procedure results in extended X-Ray/fluoroscopy time, I may develop a skin reaction. 9.  If I have a Do Not Attempt Resuscitation (DNAR) order in place, that status will be suspended while in the operating room, procedural suite, and during the recovery period unless otherwise explicitly stated by me (or a person authorized to consent on my behalf). The surgeon or my attending physician will determine when the applicable recovery period ends for purposes of reinstating the DNAR order. 10. Patients having a sterilization procedure: I understand that if the procedure is successful the results will be permanent and it will therefore be impossible for me to inseminate, conceive, or bear children. I also understand that the procedure is intended to result in sterility, although the result has not been guaranteed. 11. I acknowledge that my physician has explained sedation/analgesia administration to me including the risk and benefits I consent to the administration of sedation/analgesia as may be necessary or desirable in the judgment of my physician.     I CERTIFY THAT I HAVE READ AND FULLY UNDERSTAND THE ABOVE CONSENT TO OPERATION and/or OTHER PROCEDURE.        ____________________________________       _________________________________      ______________________________  Signature of Patient         Signature of Responsible Person        Printed Name of Responsible Person        ____________________________________      _________________________________      ______________________________       Signature of Witness          Relationship to Patient                       Date                                       Time  Patient Name: Lyssa Vargas     : 1955                 Printed: 2023      Medical Record #: [id number]                      Page 1 of

## (undated) NOTE — Clinical Note
2017    Patient: Sebastian Saini  : 1955 Visit date: 2017    Dear  Dr. Matt Graham MD,    Thank you for referring Sebastian Saini to my practice. Please find my assessment and plan below.            Assessment   Carcinoma in situ of anal ca

## (undated) NOTE — LETTER
20    Patient: Raul Graf  : 1955 Visit date: 3/3/2020    Dear  Dr. Sakina Pierce MD,    Thank you for referring Raul Graf to my practice. Please find my assessment and plan below.         Assessment   Carcinoma in situ of anal canal no external condyloma, hemorrhoids, fissures, fistula, or abscesses. Digital rectal exam reveals the prostate to be of normal shape and size. There are no palpable masses.   On anoscopy there is no evidence of any condyloma, ulceration, excoriation, evide

## (undated) NOTE — LETTER
22    Patient: Mehdi Rojas  : 1955 Visit date: 2022    Dear  Caleb Kamara MD    Thank you for referring Mehdi Roajs to my practice. Please find my assessment and plan below.     Assessment   Carcinoma in situ of anal canal  (pr There are no pigmented lesions. The patient has no proctitis or Crohn's disease. He has no external hemorrhoids. There are some other grade 2 hemorrhoids present elsewhere. The mucosal surfaces are smooth without evidence of lesions.     I discussed wi

## (undated) NOTE — ED AVS SNAPSHOT
[facility] Emergency Department    [place]  [address] [place] [address]    Phone:  [phone]    Fax:  [phone]           Elena Rosario   MRN: [id number]    Department:  [facility] Emergency Department   Date of Visit:  4/ Or call [phone]    If you have any problems with your follow-up, please call our  at [phone]    Si usted tiene algun problema con hardin sequimiento, por favor llame a nuestro adminstrador de casos al [phone]    Expect to Pharmacy Address Phone Number   [address] ([address]) [place] [address].  ([address] [facility]) [phone]   [facility] Now link in the [place] [employer]. Enter your Chromatin Activation Code exactly as it appears below along with your Zip Code and Date of Birth to complete the sign-up process. If you do not sign up before the expiration date, you must request a new code.     Jese Enriquez

## (undated) NOTE — LETTER
23    Patient: Alisha Moeller  : 1955 Visit date: 2023    Dear  Nestor Rodrigues MD    Thank you for referring Alisha Moeller to my practice. Please find my assessment and plan below. 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  The patient's initial operation was performed at an outside institution. He presented to [facility] for wide local excision where the residual tumor was resected on 2012. Pathology did demonstrate AIN 3 present. There was dysplastic epithelium extending to both mucosal margins. The deep margin was free of dysplasia. There was no evidence of invasive cancer. 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 On his last biopsies performed on 2021, one of the anal lesions also was positive for herpes viral cytopathic changes. The patient denies any symptoms such as nausea, vomiting, diarrhea, constipation, fevers, or chills. He denies any noticeable skin changes near the anus. He denies any black or tarry stools, bright red blood per rectum, or blood within the stool. The patient's last colonoscopy was in  prior to his surgery. 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 The patient has no current findings on self-exam.  He gets occasional bright red blood after defecation only on the toilet paper. He has no melena. No diarrhea. No significant constipation. He has no anorectal pain or symptoms. Clinical exam including anoscopy reveals him to have grade 2 and grade 3 internal hemorrhoids.   There are no external hemorrhoids on today's exam.  Sphincter tone is 1/4 basal, 2/4 maximum squeeze pressure. He has scarring and deformity in the anal canal that is minimal.  He has some evidence of scars from biopsies around the anal margin. There are no new signs on physical exam of HPV, AIN, or squamous cell cancer. There is no evidence of Kaposi's sarcoma. There are no new or suspicious pigmented lesions. There are no active herpetic vesicles. I will see his patient again in July 2023.        Sincerely,       Letty Aj MD   CC: No Recipients

## (undated) NOTE — IP AVS SNAPSHOT
[facility] [place] [address] [place], [address] ~ [phone]                Discharge Summary   4/14/2017    [address]           Admission Information        Provider Department    4/14/2017 Bibiana Kamara MD  Norris Monday / Jose De Jesus Manning Take 10 mg by mouth 2 (two) times daily.       [    ]    [    ]    [    ]    [    ]       Alric Donate 40 MG Pack   Generic drug:  Esomeprazole Magnesium        Take 40 mg by mouth every morning before breakfast.      [    ]    [    ]    [    ]    [    ]       S scheduled. It is advisable on the day of surgery to take two tablespoons of Milk of Magnesia twice on this day only. Repeat only if passing hard stool or if there is no bowel movement within 36 hours of surgery.  (Anal Condyloma patients will be prescribed baths more than three or four days) (Patients who have had fibrin glue used to close a fistula should not do Sitz Baths or excessively scrub this area.  This is a rare procedure and Dr Luciano Foley will inform you if fibrin glue was used.)    ACTIVITY  Every day [facility]    General Post Op Instructions:  1. Take deep breaths  ·  This can prevent pneumonia  2. Ambulate:   · Get up and walk several times a day-not strenuous  · Do foot pumps when you are sitting  · This can prevent blood clots  3. - If you are a smoker or have smoked in the last 12 months, we encourage you to explore options for quitting.     - If you have concerns related to behavioral health issues or thoughts of harming yourself, contact [facility] a

## (undated) NOTE — LETTER
09/15/20    Patient: Rafael Fischer  : 1955 Visit date: 9/15/2020    Dear  Alise Davis MD    Thank you for referring Rafael Fischer to my practice. Please find my assessment and plan below.     Assessment   Carcinoma in situ of anal canal  (pr recent G-tube exchange in February 2020. The patient states that the previous reflux that he was having with his tube feedings has gotten \"remarkably better. \"  He states that he has been having a fruit smoothie in the morning and believes this is helped

## (undated) NOTE — LETTER
21    Patient: Madison Vargas  : 1955 Visit date: 2021    Dear  Leslye Maxwell MD    Thank you for referring Madison Vargas to my practice. Please find my assessment and plan below.       Assessment   Carcinoma in situ of anal canal  (p

## (undated) NOTE — MR AVS SNAPSHOT
[facility] [address]  [address] [facility] [address]  [address] [address] [phone]               Thank you for choosing us for your health care visit with Jennifer Shultz MD.  We are glad to serve you and happy to provide you wi Today's Vital Signs     BP Temp Height Weight BMI    70/54 mmHg 98.8 °F (37.1 °C) (Oral) 72\" 165 lb 22.37 kg/m2         Current Medications          This list is accurate as of: 5/9/17  4:17 PM.  Always use your most recent med list.                acetam your Zip Code and Date of Birth to complete the sign-up process. If you do not sign up before the expiration date, you must request a new code.     Your unique [place] Access Code: [email or web address]  Expires: 7/8/2017  4:17 PM    If you have questions, you can ca

## (undated) NOTE — ED AVS SNAPSHOT
[facility] Emergency Department    [address] [address]    Phone:  [phone]    Fax:  [phone]           Jamel Renner   MRN: [id number]    Department:  [facility] Emergency Department   Date of Visit:  4/ IF THERE IS ANY CHANGE OR WORSENING OF YOUR CONDITION, CALL YOUR PRIMARY CARE PHYSICIAN AT ONCE OR RETURN IMMEDIATELY TO THE EMERGENCY DEPARTMENT.     If you have been prescribed any medication(s), please fill your prescription right away and begin taking t

## (undated) NOTE — LETTER
18    Patient: Rehan Farmer  : 1955 Visit date: 2018    Dear  Dr. Mrery Baugh MD,    Thank you for referring Rehan Farmer to my practice. Please find my assessment and plan below.       Assessment   Carcinoma in situ of anal canal  ( be normal.  Anoscopy was performed which demonstrates grade 2 internal hemorrhoids. There are no changes to the mucosal surfaces. There is no evidence of anal fissures or fistula. There is no internal condyloma acuminatum.     This patient remains free o

## (undated) NOTE — LETTER
[facility]  Phone: [phone]  Right Fax: [phone]  [address] Ernie Hernandes RN Date: 4/10/17    Patient Name: Leigha Fletcherdie  Surgery Date: 4/14/2017    CSN: [id number]  Medical Record: [id number]   D

## (undated) NOTE — LETTER
19    Patient: Rich Jaramillo  : 1955 Visit date: 2019    Dear  Dr. Christiana Young MD,    Thank you for referring Rich Jaramillo to my practice. Please find my assessment and plan below.          Assessment   Carcinoma in situ of anal canal The patient underwent a colonoscopy approximately 1 year ago. He states that there was no abnormal findings. Clinical examination reveals the patient to be awake, alert, in no acute distress.   External examination of the perineum reveals no evidence of

## (undated) NOTE — LETTER
17    Patient: Alina Reyes  : 1955 Visit date: 2017    Dear  Dr. Boo Dixon MD,    Thank you for referring Alina Reyes to my practice. Please find my assessment and plan below.                Assessment   Carcinoma in situ of anal evidence of fissures or fistulae. There is absolutely no suspicious disease.   The prostate is normal.    This patient remains free of any evidence of recurrent residual carcinoma in situ of the anal canal.  He has healed from his operations from April of

## (undated) NOTE — LETTER
21    Patient: Christo Quick  : 1955 Visit date: 3/30/2021    Dear  Reyes Noon, MD    Thank you for referring Christo Quick to my practice. Please find my assessment and plan below.     Assessment   Carcinoma in situ of anal canal  (pr right side of the anal margin. One is 7 mm in size, the other is 5 mm in size. They are irregularly shaped. They could possibly be Kaposi sarcoma. They have not changed much from prior exam, except potentially becoming more pigmented.   There are no HPV